# Patient Record
Sex: MALE | Race: WHITE | NOT HISPANIC OR LATINO | Employment: FULL TIME | URBAN - METROPOLITAN AREA
[De-identification: names, ages, dates, MRNs, and addresses within clinical notes are randomized per-mention and may not be internally consistent; named-entity substitution may affect disease eponyms.]

---

## 2018-02-10 ENCOUNTER — OFFICE VISIT (OUTPATIENT)
Dept: FAMILY MEDICINE CLINIC | Facility: CLINIC | Age: 33
End: 2018-02-10
Payer: COMMERCIAL

## 2018-02-10 VITALS
WEIGHT: 209 LBS | SYSTOLIC BLOOD PRESSURE: 122 MMHG | DIASTOLIC BLOOD PRESSURE: 86 MMHG | BODY MASS INDEX: 27.7 KG/M2 | TEMPERATURE: 97.1 F | HEART RATE: 72 BPM | HEIGHT: 73 IN | RESPIRATION RATE: 16 BRPM

## 2018-02-10 DIAGNOSIS — H66.91 RIGHT OTITIS MEDIA, UNSPECIFIED OTITIS MEDIA TYPE: Primary | ICD-10-CM

## 2018-02-10 PROCEDURE — 99213 OFFICE O/P EST LOW 20 MIN: CPT | Performed by: NURSE PRACTITIONER

## 2018-02-10 NOTE — PATIENT INSTRUCTIONS
Take medication with food  Can take some probiotic and yogurt with the medication  Avoid Q-tips  Advised dry ear precautions  Increase fluid intake, saline nasal rinses, and hot tea with honey and lemon  Cool air humidification can be helpful as well  May take Ibuprofen or Tylenol as needed for pain or fevers  Mucinex D for sinus congestion or Coricidin HBP if you have high blood pressure or a heart condition  Mucinex or Robitussin DM are effective for cough and chest congestion  Supportive care discussed and advised  Follow up for no improvement and worsening of conditions  Patient advised and educated when to see immediate medical care

## 2018-02-10 NOTE — PROGRESS NOTES
Assessment/Plan:  Take medication with food  Can take some probiotic and yogurt with the medication  Avoid Q-tips  Advised dry ear precautions  Increase fluid intake, saline nasal rinses, and hot tea with honey and lemon  Cool air humidification can be helpful as well  May take Ibuprofen or Tylenol as needed for pain or fevers  Mucinex D for sinus congestion or Coricidin HBP if you have high blood pressure or a heart condition  Mucinex or Robitussin DM are effective for cough and chest congestion  Supportive care discussed and advised  Follow up for no improvement and worsening of conditions  Patient advised and educated when to see immediate medical care  Diagnoses and all orders for this visit:    Right otitis media, unspecified otitis media type  -     clarithromycin (BIAXIN XL) 500 MG 24 hr tablet; Take 2 tablets (1,000 mg total) by mouth daily for 10 days          Return if symptoms worsen or fail to improve  Subjective:      Patient ID: Katelyn Rudolph is a 34 y o  male  Chief Complaint   Patient presents with    Cold Like Symptoms     chest congestion, body aches    Cough       HPI   Patient having cough with yellow phlegm, chest congestion, bodyaches, headache since 2/7 and fever of 100 7 and taking Mucinex DM and helped and had diarrhea this morning and last night  Having some right ear popping  Patient stated that his chills, fever and bodyaches are better since yesterday  The following portions of the patient's history were reviewed and updated as appropriate: allergies, current medications, past family history, past medical history, past social history, past surgical history and problem list       Review of Systems   Constitutional: Positive for chills, fatigue and fever  HENT: Positive for ear pain and sore throat  Negative for congestion, postnasal drip, sinus pain, sinus pressure, sneezing and voice change  Respiratory: Positive for cough and chest tightness  Negative for shortness of breath and wheezing  Cardiovascular: Negative  Gastrointestinal: Positive for diarrhea  Neurological: Negative for dizziness and headaches  Objective:    History   Smoking Status    Never Smoker   Smokeless Tobacco    Never Used       Allergies: Allergies   Allergen Reactions    Penicillins Dizziness     Reaction Date: [id number];        Vitals:  /86   Pulse 72   Temp (!) 97 1 °F (36 2 °C)   Resp 16   Ht 6' 1" (1 854 m)   Wt 94 8 kg (209 lb)   BMI 27 57 kg/m²     Current Outpatient Prescriptions   Medication Sig Dispense Refill    clarithromycin (BIAXIN XL) 500 MG 24 hr tablet Take 2 tablets (1,000 mg total) by mouth daily for 10 days 20 tablet 0     No current facility-administered medications for this visit  Physical Exam   Constitutional: He is oriented to person, place, and time  He appears well-developed and well-nourished  HENT:   Right Ear: Ear canal normal  Tympanic membrane is erythematous and bulging  A middle ear effusion is present  Left Ear: Tympanic membrane and ear canal normal    Nose: Nose normal  Right sinus exhibits no maxillary sinus tenderness and no frontal sinus tenderness  Left sinus exhibits no maxillary sinus tenderness and no frontal sinus tenderness  Mouth/Throat: Oropharynx is clear and moist and mucous membranes are normal    Cardiovascular: Normal rate and regular rhythm  Pulmonary/Chest: Effort normal and breath sounds normal    Musculoskeletal: Normal range of motion  Lymphadenopathy:        Right cervical: No superficial cervical and no posterior cervical adenopathy present  Left cervical: No superficial cervical and no posterior cervical adenopathy present  Neurological: He is alert and oriented to person, place, and time  Skin: Skin is warm and dry  Psychiatric: He has a normal mood and affect  Vitals reviewed          CELINE Smith

## 2018-02-14 ENCOUNTER — TELEPHONE (OUTPATIENT)
Dept: FAMILY MEDICINE CLINIC | Facility: CLINIC | Age: 33
End: 2018-02-14

## 2018-02-14 NOTE — TELEPHONE ENCOUNTER
Spoke to  Pt states  Has felt light headed  Since the beginning  of treatment   has completed 5 days of abx  Advised pt to stop  Abx   Pt states   Not having any chills, earache, or fever  symptoms  Have improved  Pt aware to call if symptoms arise  af/rma

## 2018-02-14 NOTE — TELEPHONE ENCOUNTER
JAMARI    Patient was seen and given an antibiotic  He said it is making him dizzy? Please call back and advise

## 2018-02-15 ENCOUNTER — OFFICE VISIT (OUTPATIENT)
Dept: FAMILY MEDICINE CLINIC | Facility: CLINIC | Age: 33
End: 2018-02-15
Payer: COMMERCIAL

## 2018-02-15 VITALS
TEMPERATURE: 97.3 F | HEIGHT: 73 IN | BODY MASS INDEX: 27.43 KG/M2 | RESPIRATION RATE: 20 BRPM | SYSTOLIC BLOOD PRESSURE: 136 MMHG | WEIGHT: 207 LBS | DIASTOLIC BLOOD PRESSURE: 72 MMHG | HEART RATE: 76 BPM

## 2018-02-15 DIAGNOSIS — R42 DIZZINESS: Primary | ICD-10-CM

## 2018-02-15 PROCEDURE — 3008F BODY MASS INDEX DOCD: CPT | Performed by: NURSE PRACTITIONER

## 2018-02-15 PROCEDURE — 99213 OFFICE O/P EST LOW 20 MIN: CPT | Performed by: NURSE PRACTITIONER

## 2018-02-15 RX ORDER — NEOMYCIN SULFATE, POLYMYXIN B SULFATE AND HYDROCORTISONE 10; 3.5; 1 MG/ML; MG/ML; [USP'U]/ML
3 SUSPENSION/ DROPS AURICULAR (OTIC) 4 TIMES DAILY
Qty: 10 ML | Refills: 0 | Status: SHIPPED | OUTPATIENT
Start: 2018-02-15 | End: 2018-10-23

## 2018-02-15 RX ORDER — MECLIZINE HCL 12.5 MG/1
12.5 TABLET ORAL 3 TIMES DAILY PRN
Qty: 30 TABLET | Refills: 0 | Status: SHIPPED | OUTPATIENT
Start: 2018-02-15 | End: 2018-10-23

## 2018-02-15 RX ORDER — MECLIZINE HCL 12.5 MG/1
12.5 TABLET ORAL 3 TIMES DAILY PRN
Qty: 30 TABLET | Refills: 0 | Status: SHIPPED | OUTPATIENT
Start: 2018-02-15 | End: 2018-02-15 | Stop reason: CLARIF

## 2018-02-15 NOTE — PROGRESS NOTES
Assessment/Plan:  Dizziness seems due to ear infection at this time and otitis media is better and will treat with topical antibiotic and meclizine prn ordered  IF no improvement, patient advised to follow up as neuro exam is normal and will consider MRI of head  Supportive care discussed and advised  Follow up for no improvement and worsening of conditions  Patient advised and educated when to see immediate medical care  Diagnoses and all orders for this visit:    Dizziness  -     Discontinue: meclizine (ANTIVERT) 12 5 MG tablet; Take 1 tablet (12 5 mg total) by mouth 3 (three) times a day as needed for dizziness  -     neomycin-polymyxin-hydrocortisone (CORTISPORIN) 0 35%-10,000 units/mL-1% otic suspension; Administer 3 drops to the right ear 4 (four) times a day  -     meclizine (ANTIVERT) 12 5 MG tablet; Take 1 tablet (12 5 mg total) by mouth 3 (three) times a day as needed for dizziness          Return if symptoms worsen or fail to improve  Subjective:      Patient ID: Agustina Ricketts is a 28 y o  male  Chief Complaint   Patient presents with   Benjie Slimmer Like Symptoms     from Saturday, finished abx    Blurred Vision     cant focus       HPI   Patient was seen on 2/10 and was started on Biaxin and started having dizziness on 2/12 and stopped biaxin yesterday and dizziness is better but still having it  Denies any fever, neck pain, headache, weakness, tingling, chest pain  Having mild blurry vision at times  The following portions of the patient's history were reviewed and updated as appropriate: allergies, current medications, past family history, past medical history, past social history, past surgical history and problem list       Review of Systems   Constitutional: Negative  HENT: Negative  Respiratory: Negative  Cardiovascular: Negative  Neurological: Positive for dizziness  Negative for tremors, facial asymmetry, speech difficulty, weakness, numbness and headaches  Psychiatric/Behavioral: Negative  Objective:    History   Smoking Status    Never Smoker   Smokeless Tobacco    Never Used       Allergies: Allergies   Allergen Reactions    Penicillins Dizziness     Reaction Date: [id number];        Vitals:  /72   Pulse 76   Temp (!) 97 3 °F (36 3 °C)   Resp 20   Ht 6' 1" (1 854 m)   Wt 93 9 kg (207 lb)   BMI 27 31 kg/m²     Current Outpatient Prescriptions   Medication Sig Dispense Refill    clarithromycin (BIAXIN XL) 500 MG 24 hr tablet Take 2 tablets (1,000 mg total) by mouth daily for 10 days 20 tablet 0    meclizine (ANTIVERT) 12 5 MG tablet Take 1 tablet (12 5 mg total) by mouth 3 (three) times a day as needed for dizziness 30 tablet 0    neomycin-polymyxin-hydrocortisone (CORTISPORIN) 0 35%-10,000 units/mL-1% otic suspension Administer 3 drops to the right ear 4 (four) times a day 10 mL 0     No current facility-administered medications for this visit  Physical Exam   Constitutional: He is oriented to person, place, and time  He appears well-developed and well-nourished  HENT:   Right Ear: Ear canal normal  Tympanic membrane is bulging  Left Ear: Tympanic membrane and ear canal normal    Nose: Nose normal  Right sinus exhibits no maxillary sinus tenderness and no frontal sinus tenderness  Left sinus exhibits no maxillary sinus tenderness and no frontal sinus tenderness  Mouth/Throat: Oropharynx is clear and moist and mucous membranes are normal    Cardiovascular: Normal rate, regular rhythm and normal heart sounds  Pulmonary/Chest: Effort normal and breath sounds normal    Musculoskeletal: Normal range of motion  Lymphadenopathy:        Right cervical: No superficial cervical and no posterior cervical adenopathy present  Left cervical: No superficial cervical and no posterior cervical adenopathy present  Neurological: He is alert and oriented to person, place, and time  He has normal strength and normal reflexes   No sensory deficit  He displays a negative Romberg sign  GCS eye subscore is 4  GCS verbal subscore is 5  GCS motor subscore is 6  Ania Hallpike negative for nystagmus  Skin: Skin is warm and dry  Psychiatric: He has a normal mood and affect  Vitals reviewed          CELINE Dennis

## 2018-02-15 NOTE — PATIENT INSTRUCTIONS
Supportive care discussed and advised  Follow up for no improvement and worsening of conditions  Patient advised and educated when to see immediate medical care  Dizziness   AMBULATORY CARE:   Dizziness  is a feeling of being off balance or unsteady  Common causes of dizziness are an inner ear fluid imbalance or a lack of oxygen in your blood  Dizziness may be acute (lasts 3 days or less) or chronic (lasts longer than 3 days)  You may have dizzy spells that last from seconds to a few hours  Common symptoms that may happen with dizziness:   · A feeling that your surroundings are moving even though you are standing still    · Ringing in your ears or hearing loss     · Feeling faint or lightheaded     · Weakness or unsteadiness     · Double vision or eye movements you cannot control    · Nausea or vomiting     · Confusion  Seek care immediately if:   · You have a headache and a stiff neck  · You have shaking chills and a fever  · You vomit over and over with no relief  · Your vomit or bowel movements are red or black  · You have pain in your chest, back, or abdomen  · You have numbness, especially in your face, arms, or legs  · You have trouble moving your arms or legs  · You are confused  Contact your healthcare provider if:   · You have a fever  · Your symptoms do not get better with treatment  · You have questions or concerns about your condition or care  Treatment for dizziness  depends on the cause  Your healthcare provider may give you oxygen or medicines to decrease your dizziness and nausea  He may also refer you to a specialist  Viviane Minder may need to be admitted to the hospital for treatment  Manage your symptoms:   · Do not drive  or operate heavy machinery when you are dizzy  · Get up slowly  from sitting or lying down  · Drink plenty of liquids  Liquids help prevent dehydration  Ask how much liquid to drink each day and which liquids are best for you    Follow up with your healthcare provider as directed:  Write down your questions so you remember to ask them during your visits  © 2017 [employer] Information is for End User's use only and may not be sold, redistributed or otherwise used for commercial purposes  All illustrations and images included in CareNotes® are the copyrighted property of [employer]  or Carlos Julien  The above information is an  only  It is not intended as medical advice for individual conditions or treatments  Talk to your doctor, nurse or pharmacist before following any medical regimen to see if it is safe and effective for you

## 2018-10-23 ENCOUNTER — OFFICE VISIT (OUTPATIENT)
Dept: FAMILY MEDICINE CLINIC | Facility: CLINIC | Age: 33
End: 2018-10-23
Payer: COMMERCIAL

## 2018-10-23 VITALS
WEIGHT: 182 LBS | RESPIRATION RATE: 16 BRPM | SYSTOLIC BLOOD PRESSURE: 138 MMHG | BODY MASS INDEX: 24.12 KG/M2 | HEIGHT: 73 IN | HEART RATE: 80 BPM | TEMPERATURE: 96.6 F | DIASTOLIC BLOOD PRESSURE: 98 MMHG

## 2018-10-23 DIAGNOSIS — F41.9 ANXIETY AND DEPRESSION: Primary | ICD-10-CM

## 2018-10-23 DIAGNOSIS — F32.A ANXIETY AND DEPRESSION: Primary | ICD-10-CM

## 2018-10-23 DIAGNOSIS — R03.0 ELEVATED BP WITHOUT DIAGNOSIS OF HYPERTENSION: ICD-10-CM

## 2018-10-23 PROCEDURE — 3008F BODY MASS INDEX DOCD: CPT | Performed by: FAMILY MEDICINE

## 2018-10-23 PROCEDURE — 99214 OFFICE O/P EST MOD 30 MIN: CPT | Performed by: FAMILY MEDICINE

## 2018-10-23 PROCEDURE — 1036F TOBACCO NON-USER: CPT | Performed by: FAMILY MEDICINE

## 2018-10-23 RX ORDER — VENLAFAXINE HYDROCHLORIDE 75 MG/1
75 TABLET, EXTENDED RELEASE ORAL
Qty: 30 TABLET | Refills: 1 | Status: SHIPPED | OUTPATIENT
Start: 2018-10-23 | End: 2018-12-05 | Stop reason: SDUPTHER

## 2018-10-23 NOTE — PROGRESS NOTES
Assessment/Plan:    No problem-specific Assessment & Plan notes found for this encounter  Anxiety/depression new  He will think about counseling  F/u 1m  F/u blood pressure     Diagnoses and all orders for this visit:    Anxiety and depression  -     venlafaxine 75 mg 24 hr tablet; Take 1 tablet (75 mg total) by mouth daily with breakfast    Elevated BP without diagnosis of hypertension    BMI 24 0-24 9, adult              No Follow-up on file  Subjective:      Patient ID: Carmina Johnson is a 28 y o  male  Chief Complaint   Patient presents with    Anxiety     prcma       HPI  Anxiety  Working [employer]  Has fam hx of anxiety  Sister told him to be seen  Feeling anxious  All the time  Constant thinking and worrying and overthinking  Excessive  Wife agrees  Very stressed  Sleeps ok but not rested  Struggles to get out of bed and not happy at work  Irritable  Short tempered  Cleans when stressed  No stress eater  Lost 30# in 8m  No counseling in past  The patient was advised about the office disability policy and that no forms of this nature would be promised or completed  This was explained clearly to the patient's understanding  Sad at times  fam hx of mood issues  Low energy  Feels tired at start of day  Years    The following portions of the patient's history were reviewed and updated as appropriate: allergies, current medications, past family history, past medical history, past social history, past surgical history and problem list     Review of Systems   Respiratory: Negative for shortness of breath  Cardiovascular: Negative for chest pain  Psychiatric/Behavioral: Negative for hallucinations and suicidal ideas  Current Outpatient Prescriptions   Medication Sig Dispense Refill    venlafaxine 75 mg 24 hr tablet Take 1 tablet (75 mg total) by mouth daily with breakfast 30 tablet 1     No current facility-administered medications for this visit          Objective:    /98   Pulse 80 Temp (!) 96 6 °F (35 9 °C)   Resp 16   Ht 6' 1" (1 854 m)   Wt 82 6 kg (182 lb)   BMI 24 01 kg/m²        Physical Exam   Constitutional: He appears well-developed  No distress  HENT:   Head: Normocephalic  Mouth/Throat: No oropharyngeal exudate  Eyes: Conjunctivae are normal  Right eye exhibits no discharge  Left eye exhibits no discharge  No scleral icterus  Neck: Neck supple  No thyromegaly present  Cardiovascular: Normal rate and intact distal pulses  No murmur heard  Pulmonary/Chest: Effort normal  No respiratory distress  Abdominal: Soft  There is no tenderness  There is no rebound  Musculoskeletal: He exhibits no edema or deformity  Lymphadenopathy:     He has no cervical adenopathy  Neurological: He is alert  He exhibits normal muscle tone  Skin: Skin is warm and dry  No rash noted  Psychiatric: His behavior is normal  Thought content normal    Nursing note and vitals reviewed               Belinda Ponce DO

## 2018-10-24 PROBLEM — R03.0 ELEVATED BP WITHOUT DIAGNOSIS OF HYPERTENSION: Status: ACTIVE | Noted: 2018-10-24

## 2018-12-05 ENCOUNTER — TELEPHONE (OUTPATIENT)
Dept: FAMILY MEDICINE CLINIC | Facility: CLINIC | Age: 33
End: 2018-12-05

## 2018-12-05 DIAGNOSIS — F32.A ANXIETY AND DEPRESSION: Primary | ICD-10-CM

## 2018-12-05 DIAGNOSIS — F41.9 ANXIETY AND DEPRESSION: Primary | ICD-10-CM

## 2018-12-05 RX ORDER — VENLAFAXINE HYDROCHLORIDE 37.5 MG/1
37.5 TABLET, EXTENDED RELEASE ORAL
Qty: 30 TABLET | Refills: 5 | Status: SHIPPED | OUTPATIENT
Start: 2018-12-05 | End: 2019-07-02 | Stop reason: SDUPTHER

## 2018-12-05 NOTE — TELEPHONE ENCOUNTER
DR Krysta Hardy    Patient feels the venlaflaxine is too strong for him  He would like the dose lowered or medication changed  Please advise

## 2018-12-05 NOTE — TELEPHONE ENCOUNTER
Pt aware of new prescription at pharmacy     Pt aware to call the Office with any questions or concerns  Task Complete rmklpn

## 2019-04-17 DIAGNOSIS — J06.9 UPPER RESPIRATORY TRACT INFECTION, UNSPECIFIED TYPE: Primary | ICD-10-CM

## 2019-04-17 RX ORDER — AZITHROMYCIN 250 MG/1
TABLET, FILM COATED ORAL
Qty: 6 TABLET | Refills: 0 | Status: SHIPPED | OUTPATIENT
Start: 2019-04-17 | End: 2019-04-22

## 2019-07-02 DIAGNOSIS — F32.A ANXIETY AND DEPRESSION: ICD-10-CM

## 2019-07-02 DIAGNOSIS — F41.9 ANXIETY AND DEPRESSION: ICD-10-CM

## 2019-07-02 RX ORDER — VENLAFAXINE 37.5 MG/1
TABLET ORAL
Qty: 30 TABLET | Refills: 0 | Status: SHIPPED | OUTPATIENT
Start: 2019-07-02 | End: 2019-08-12

## 2019-08-12 ENCOUNTER — OFFICE VISIT (OUTPATIENT)
Dept: FAMILY MEDICINE CLINIC | Facility: CLINIC | Age: 34
End: 2019-08-12
Payer: COMMERCIAL

## 2019-08-12 ENCOUNTER — APPOINTMENT (OUTPATIENT)
Dept: RADIOLOGY | Facility: CLINIC | Age: 34
End: 2019-08-12
Payer: COMMERCIAL

## 2019-08-12 VITALS
HEART RATE: 88 BPM | HEIGHT: 73 IN | RESPIRATION RATE: 16 BRPM | WEIGHT: 230 LBS | TEMPERATURE: 98.5 F | SYSTOLIC BLOOD PRESSURE: 126 MMHG | DIASTOLIC BLOOD PRESSURE: 80 MMHG | BODY MASS INDEX: 30.48 KG/M2

## 2019-08-12 DIAGNOSIS — M79.671 RIGHT FOOT PAIN: ICD-10-CM

## 2019-08-12 DIAGNOSIS — F41.1 GAD (GENERALIZED ANXIETY DISORDER): Primary | ICD-10-CM

## 2019-08-12 DIAGNOSIS — E66.9 OBESITY (BMI 30-39.9): ICD-10-CM

## 2019-08-12 PROBLEM — R03.0 ELEVATED BP WITHOUT DIAGNOSIS OF HYPERTENSION: Status: RESOLVED | Noted: 2018-10-24 | Resolved: 2019-08-12

## 2019-08-12 PROCEDURE — 99214 OFFICE O/P EST MOD 30 MIN: CPT | Performed by: FAMILY MEDICINE

## 2019-08-12 PROCEDURE — 73630 X-RAY EXAM OF FOOT: CPT

## 2019-08-12 PROCEDURE — 3008F BODY MASS INDEX DOCD: CPT | Performed by: FAMILY MEDICINE

## 2019-08-12 PROCEDURE — 1036F TOBACCO NON-USER: CPT | Performed by: FAMILY MEDICINE

## 2019-08-12 RX ORDER — ESCITALOPRAM OXALATE 10 MG/1
10 TABLET ORAL DAILY
Qty: 30 TABLET | Refills: 1 | Status: SHIPPED | OUTPATIENT
Start: 2019-08-12 | End: 2019-10-15 | Stop reason: SDUPTHER

## 2019-08-12 RX ORDER — MELOXICAM 15 MG/1
15 TABLET ORAL DAILY PRN
Qty: 30 TABLET | Refills: 0 | Status: SHIPPED | OUTPATIENT
Start: 2019-08-12 | End: 2020-04-22

## 2019-08-12 NOTE — LETTER
August 12, 2019     Patient: Krystle Jensen   YOB: 1985   Date of Visit: 8/12/2019       To Whom it May Concern:    Krystle Jensen is under my professional care  He was seen in my office on 8/12/2019  If you have any questions or concerns, please don't hesitate to call           Sincerely,          Tu Andino DO        CC: No Recipients

## 2019-08-12 NOTE — PROGRESS NOTES
Assessment/Plan:    No problem-specific Assessment & Plan notes found for this encounter  CHA not better, sleep issues with effexor, try lexapro, f/u 4w  Right foot pain, suspect tendonitis, rest/nsaids, xr, podiatry if no better, no sx of gout  bmi aware  BMI Counseling: Body mass index is 30 34 kg/m²  Discussed the patient's BMI with him  The BMI is above average  BMI counseling and education was provided to the patient  Nutrition recommendations include decreasing overall calorie intake  Diagnoses and all orders for this visit:    CHA (generalized anxiety disorder)  -     escitalopram (LEXAPRO) 10 mg tablet; Take 1 tablet (10 mg total) by mouth daily    Right foot pain  -     meloxicam (MOBIC) 15 mg tablet; Take 1 tablet (15 mg total) by mouth daily as needed for mild pain or moderate pain for up to 30 days  -     Ambulatory referral to Podiatry; Future  -     Cancel: XR foot 3+ vw left; Future  -     XR foot 3+ vw right; Future    Obesity (BMI 30-39  9)              Return in about 1 month (around 9/12/2019) for Recheck  Subjective:      Patient ID: Leeroy Boland is a 27 y o  male  Chief Complaint   Patient presents with    medication review     prcma    Foot Pain     right foot        HPI  Stopped effexor  Lower dose helped sleep difficulty but started to get tremors  Some DSA with lower dose  Not on good on empty stomach    Off entirely for 1m  Wife says he is irritable, better on something  Some anxiety  Does have stress at work  Depressed at times  Angry at times  Can get heart racing  Occasional crying  Some sadness  Denies suicidal/homicidal/destructive ideations    No food excess  Social etoh    Some wt gain lately    Right foot pain  1m  With dorsiflexion/extreme  No running  No injury  No shoewear changes    The following portions of the patient's history were reviewed and updated as appropriate: allergies, current medications, past family history, past medical history, past social history, past surgical history and problem list     Review of Systems   Constitutional: Negative for chills and fever  Psychiatric/Behavioral: Negative for suicidal ideas  Current Outpatient Medications   Medication Sig Dispense Refill    escitalopram (LEXAPRO) 10 mg tablet Take 1 tablet (10 mg total) by mouth daily 30 tablet 1    meloxicam (MOBIC) 15 mg tablet Take 1 tablet (15 mg total) by mouth daily as needed for mild pain or moderate pain for up to 30 days 30 tablet 0     No current facility-administered medications for this visit  Objective:    /80   Pulse 88   Temp 98 5 °F (36 9 °C)   Resp 16   Ht 6' 1" (1 854 m)   Wt 104 kg (230 lb)   BMI 30 34 kg/m²        Physical Exam   Constitutional: He appears well-developed  No distress  HENT:   Head: Normocephalic  Right Ear: External ear normal    Left Ear: External ear normal    Nose: Nose normal    Mouth/Throat: Oropharynx is clear and moist  No oropharyngeal exudate  Eyes: Conjunctivae are normal  No scleral icterus  Neck: Neck supple  No tracheal deviation present  No thyromegaly present  Cardiovascular: Normal rate, regular rhythm, normal heart sounds and intact distal pulses  No murmur heard  Pulmonary/Chest: Effort normal and breath sounds normal  No respiratory distress  He has no wheezes  He has no rales  Abdominal: Soft  Bowel sounds are normal  He exhibits no distension  There is no tenderness  Musculoskeletal: He exhibits tenderness  He exhibits no edema or deformity  Tender along 1st metatarsal with toe flexion, no redness or warmth or podagra   Lymphadenopathy:     He has no cervical adenopathy  Neurological: He is alert  He exhibits normal muscle tone  Skin: Skin is warm and dry  No pallor  Psychiatric: His behavior is normal  Thought content normal    Nursing note and vitals reviewed               Macie Malone DO

## 2019-10-15 DIAGNOSIS — F41.1 GAD (GENERALIZED ANXIETY DISORDER): ICD-10-CM

## 2019-10-15 RX ORDER — ESCITALOPRAM OXALATE 10 MG/1
10 TABLET ORAL DAILY
Qty: 90 TABLET | Refills: 1 | Status: SHIPPED | OUTPATIENT
Start: 2019-10-15 | End: 2019-10-21 | Stop reason: SDUPTHER

## 2019-10-21 ENCOUNTER — OFFICE VISIT (OUTPATIENT)
Dept: FAMILY MEDICINE CLINIC | Facility: CLINIC | Age: 34
End: 2019-10-21
Payer: COMMERCIAL

## 2019-10-21 VITALS
SYSTOLIC BLOOD PRESSURE: 120 MMHG | RESPIRATION RATE: 18 BRPM | DIASTOLIC BLOOD PRESSURE: 84 MMHG | OXYGEN SATURATION: 99 % | HEART RATE: 74 BPM | TEMPERATURE: 97.8 F | BODY MASS INDEX: 31.41 KG/M2 | WEIGHT: 237 LBS | HEIGHT: 73 IN

## 2019-10-21 DIAGNOSIS — F41.1 GAD (GENERALIZED ANXIETY DISORDER): ICD-10-CM

## 2019-10-21 DIAGNOSIS — F32.A ANXIETY AND DEPRESSION: Primary | ICD-10-CM

## 2019-10-21 DIAGNOSIS — Z13.1 SCREENING FOR DIABETES MELLITUS (DM): ICD-10-CM

## 2019-10-21 DIAGNOSIS — F41.9 ANXIETY AND DEPRESSION: Primary | ICD-10-CM

## 2019-10-21 DIAGNOSIS — Z13.83 SCREENING FOR CARDIOVASCULAR, RESPIRATORY, AND GENITOURINARY DISEASES: ICD-10-CM

## 2019-10-21 DIAGNOSIS — E66.9 OBESITY (BMI 30-39.9): ICD-10-CM

## 2019-10-21 DIAGNOSIS — Z13.89 SCREENING FOR CARDIOVASCULAR, RESPIRATORY, AND GENITOURINARY DISEASES: ICD-10-CM

## 2019-10-21 DIAGNOSIS — Z23 IMMUNIZATION DUE: ICD-10-CM

## 2019-10-21 DIAGNOSIS — M72.2 PLANTAR FASCIITIS, RIGHT: ICD-10-CM

## 2019-10-21 DIAGNOSIS — Z13.6 SCREENING FOR CARDIOVASCULAR, RESPIRATORY, AND GENITOURINARY DISEASES: ICD-10-CM

## 2019-10-21 PROCEDURE — 3008F BODY MASS INDEX DOCD: CPT | Performed by: FAMILY MEDICINE

## 2019-10-21 PROCEDURE — 90471 IMMUNIZATION ADMIN: CPT

## 2019-10-21 PROCEDURE — 99214 OFFICE O/P EST MOD 30 MIN: CPT | Performed by: FAMILY MEDICINE

## 2019-10-21 PROCEDURE — 90686 IIV4 VACC NO PRSV 0.5 ML IM: CPT

## 2019-10-21 RX ORDER — ESCITALOPRAM OXALATE 10 MG/1
10 TABLET ORAL DAILY
Qty: 90 TABLET | Refills: 1 | Status: SHIPPED | OUTPATIENT
Start: 2019-10-21 | End: 2020-04-22 | Stop reason: SDUPTHER

## 2019-10-21 NOTE — PROGRESS NOTES
Assessment/Plan:    No problem-specific Assessment & Plan notes found for this encounter  Anxiety/depression better on lexapro, cont same  Right plantar fascitis, ice/massage instructed, arch supports, podiatry if no better  Screening labs ordered     Diagnoses and all orders for this visit:    Anxiety and depression    Plantar fasciitis, right    Screening for cardiovascular, respiratory, and genitourinary diseases  -     Lipid Panel with Direct LDL reflex; Future  -     Lipid Panel with Direct LDL reflex    Screening for diabetes mellitus (DM)  -     Comprehensive metabolic panel; Future  -     Comprehensive metabolic panel    Obesity (BMI 30-39 9)  -     TSH, 3rd generation; Future  -     TSH, 3rd generation    CHA (generalized anxiety disorder)  -     escitalopram (LEXAPRO) 10 mg tablet; Take 1 tablet (10 mg total) by mouth daily    Immunization due  -     influenza vaccine, [id number], quadrivalent, 0 5 mL, preservative-free, for adult and pediatric patients 6 mos+ (AFLURIA, FLUARIX, FLULAVAL, FLUZONE)              Return in about 6 months (around 4/21/2020) for Recheck  Subjective:      Patient ID: Aisha Cantu is a 27 y o  male      Chief Complaint   Patient presents with    Follow-up     medication review and anxiety issues  rmklpn       HPI  Eating out less  Now packing lunches  3 kids  Plays flag football on weekends  CHA stable on lexapro  effexor made him jittery and lightheadedness  Not lost weight  Trying to eat healthier  Tired in am  Sleeps 8 hours  Insomnia with effexor, no more waking  Work stressors  More relaxed  Foot better, took a while  Heel pain in am R>L  Worse with first few steps  No redness or discoloration  Few wks    The following portions of the patient's history were reviewed and updated as appropriate: allergies, current medications, past family history, past medical history, past social history, past surgical history and problem list     Review of Systems   Respiratory: Negative for shortness of breath  Cardiovascular: Negative for chest pain and leg swelling  Current Outpatient Medications   Medication Sig Dispense Refill    escitalopram (LEXAPRO) 10 mg tablet Take 1 tablet (10 mg total) by mouth daily 90 tablet 1    meloxicam (MOBIC) 15 mg tablet Take 1 tablet (15 mg total) by mouth daily as needed for mild pain or moderate pain for up to 30 days 30 tablet 0     No current facility-administered medications for this visit  Objective:    /84   Pulse 74   Temp 97 8 °F (36 6 °C)   Resp 18   Ht 6' 1" (1 854 m)   Wt 108 kg (237 lb)   SpO2 99%   BMI 31 27 kg/m²        Physical Exam   Constitutional: He appears well-developed  No distress  HENT:   Head: Normocephalic  Mouth/Throat: No oropharyngeal exudate  Eyes: Conjunctivae are normal  No scleral icterus  Neck: Neck supple  Cardiovascular: Normal rate, regular rhythm and intact distal pulses  Pulmonary/Chest: Effort normal and breath sounds normal  No respiratory distress  He has no wheezes  Abdominal: Soft  Bowel sounds are normal  There is no tenderness  Musculoskeletal: He exhibits tenderness  He exhibits no edema or deformity  Right anterior heel   Neurological: He is alert  Skin: Skin is warm and dry  No pallor  Psychiatric: His behavior is normal  Thought content normal    Nursing note and vitals reviewed               Dain Miller DO

## 2019-12-07 ENCOUNTER — OFFICE VISIT (OUTPATIENT)
Dept: FAMILY MEDICINE CLINIC | Facility: CLINIC | Age: 34
End: 2019-12-07
Payer: COMMERCIAL

## 2019-12-07 VITALS
SYSTOLIC BLOOD PRESSURE: 122 MMHG | HEIGHT: 73 IN | BODY MASS INDEX: 31.14 KG/M2 | TEMPERATURE: 97.6 F | WEIGHT: 235 LBS | OXYGEN SATURATION: 97 % | HEART RATE: 68 BPM | RESPIRATION RATE: 18 BRPM | DIASTOLIC BLOOD PRESSURE: 74 MMHG

## 2019-12-07 DIAGNOSIS — J01.00 ACUTE NON-RECURRENT MAXILLARY SINUSITIS: Primary | ICD-10-CM

## 2019-12-07 PROCEDURE — 3008F BODY MASS INDEX DOCD: CPT | Performed by: NURSE PRACTITIONER

## 2019-12-07 PROCEDURE — 1036F TOBACCO NON-USER: CPT | Performed by: NURSE PRACTITIONER

## 2019-12-07 PROCEDURE — 99213 OFFICE O/P EST LOW 20 MIN: CPT | Performed by: NURSE PRACTITIONER

## 2019-12-07 RX ORDER — FLUTICASONE PROPIONATE 50 MCG
2 SPRAY, SUSPENSION (ML) NASAL DAILY
Qty: 16 G | Refills: 3 | Status: SHIPPED | OUTPATIENT
Start: 2019-12-07 | End: 2020-04-22 | Stop reason: SDUPTHER

## 2019-12-07 RX ORDER — AZITHROMYCIN 250 MG/1
TABLET, FILM COATED ORAL
Qty: 6 TABLET | Refills: 0 | Status: SHIPPED | OUTPATIENT
Start: 2019-12-07 | End: 2019-12-12

## 2019-12-07 NOTE — PROGRESS NOTES
Assessment/Plan:    Take medications as directed  Reviewed supportive care  RTO prn  Pt to call for ENT referral once he gets his new insurance  1  Acute non-recurrent maxillary sinusitis  -     fluticasone (FLONASE) 50 mcg/act nasal spray; 2 sprays into each nostril daily  -     azithromycin (ZITHROMAX) 250 mg tablet; 2 tabs PO day 1, then 1 tab PO days 2-5            There are no Patient Instructions on file for this visit  No follow-ups on file  Subjective:      Patient ID: Maribell Munguia is a 27 y o  male  Chief Complaint   Patient presents with    Sinus Problem     [facility]       Here today with complaints of cold symptoms for the past month  He has worsening sinus and nasal congestion and sinus pressure  He has a cough that is worse in the morning  Denies fevers, SOB, or wheezing  Using his neti pot frequently  States whenever he gets a cold, it always progresses to a sinus infection      The following portions of the patient's history were reviewed and updated as appropriate: allergies, current medications, past family history, past medical history, past social history, past surgical history and problem list     Review of Systems   Constitutional: Negative for chills, fatigue and fever  HENT: Positive for congestion, sinus pressure and sore throat  Negative for ear pain, postnasal drip and rhinorrhea  Respiratory: Negative for cough, shortness of breath and wheezing  Cardiovascular: Negative for chest pain  Gastrointestinal: Negative for abdominal pain, diarrhea, nausea and vomiting  Musculoskeletal: Negative for arthralgias  Skin: Negative for rash  Neurological: Negative for headaches           Current Outpatient Medications   Medication Sig Dispense Refill    escitalopram (LEXAPRO) 10 mg tablet Take 1 tablet (10 mg total) by mouth daily 90 tablet 1    azithromycin (ZITHROMAX) 250 mg tablet 2 tabs PO day 1, then 1 tab PO days 2-5 6 tablet 0    fluticasone (FLONASE) 50 mcg/act nasal spray 2 sprays into each nostril daily 16 g 3    meloxicam (MOBIC) 15 mg tablet Take 1 tablet (15 mg total) by mouth daily as needed for mild pain or moderate pain for up to 30 days (Patient not taking: Reported on 12/7/2019) 30 tablet 0     No current facility-administered medications for this visit  Objective:    /74   Pulse 68   Temp 97 6 °F (36 4 °C)   Resp 18   Ht 6' 1" (1 854 m)   Wt 107 kg (235 lb)   SpO2 97%   BMI 31 00 kg/m²        Physical Exam   Constitutional: He appears well-developed and well-nourished  HENT:   Head: Normocephalic and atraumatic  Right Ear: Tympanic membrane, external ear and ear canal normal    Left Ear: Tympanic membrane, external ear and ear canal normal    Nose: Mucosal edema and rhinorrhea (mucoid) present  Right sinus exhibits maxillary sinus tenderness  Left sinus exhibits maxillary sinus tenderness  Mouth/Throat: Uvula is midline, oropharynx is clear and moist and mucous membranes are normal    Eyes: Conjunctivae are normal    Neck: Neck supple  No edema present  No thyromegaly present  Cardiovascular: Normal rate, regular rhythm, normal heart sounds and intact distal pulses  No murmur heard  Pulmonary/Chest: Effort normal and breath sounds normal    Abdominal: Bowel sounds are normal  He exhibits no distension  There is no splenomegaly or hepatomegaly  There is no tenderness  Lymphadenopathy:        Right cervical: No superficial cervical adenopathy present  Left cervical: No superficial cervical adenopathy present  Skin: Skin is warm, dry and intact  No rash noted  Psychiatric: He has a normal mood and affect  Nursing note and vitals reviewed               Matt Franks

## 2020-04-22 ENCOUNTER — TELEMEDICINE (OUTPATIENT)
Dept: FAMILY MEDICINE CLINIC | Facility: CLINIC | Age: 35
End: 2020-04-22
Payer: COMMERCIAL

## 2020-04-22 DIAGNOSIS — F32.A ANXIETY AND DEPRESSION: Primary | ICD-10-CM

## 2020-04-22 DIAGNOSIS — F41.1 GAD (GENERALIZED ANXIETY DISORDER): ICD-10-CM

## 2020-04-22 DIAGNOSIS — F41.9 ANXIETY AND DEPRESSION: Primary | ICD-10-CM

## 2020-04-22 DIAGNOSIS — J30.9 ALLERGIC RHINITIS, UNSPECIFIED SEASONALITY, UNSPECIFIED TRIGGER: ICD-10-CM

## 2020-04-22 DIAGNOSIS — E66.9 OBESITY (BMI 30-39.9): ICD-10-CM

## 2020-04-22 DIAGNOSIS — G56.03 CARPAL TUNNEL SYNDROME, BILATERAL: ICD-10-CM

## 2020-04-22 PROCEDURE — 99214 OFFICE O/P EST MOD 30 MIN: CPT | Performed by: FAMILY MEDICINE

## 2020-04-22 RX ORDER — FLUTICASONE PROPIONATE 50 MCG
2 SPRAY, SUSPENSION (ML) NASAL DAILY
Qty: 16 G | Refills: 5 | Status: SHIPPED | OUTPATIENT
Start: 2020-04-22 | End: 2021-06-06

## 2020-04-22 RX ORDER — ESCITALOPRAM OXALATE 10 MG/1
10 TABLET ORAL DAILY
Qty: 90 TABLET | Refills: 1 | Status: SHIPPED | OUTPATIENT
Start: 2020-04-22 | End: 2021-11-01

## 2020-04-22 RX ORDER — DOXYCYCLINE HYCLATE 100 MG
TABLET ORAL
COMMUNITY
Start: 2020-04-15 | End: 2020-04-22

## 2020-04-22 RX ORDER — LISINOPRIL AND HYDROCHLOROTHIAZIDE 20; 12.5 MG/1; MG/1
TABLET ORAL
COMMUNITY
Start: 2020-03-30 | End: 2020-04-22

## 2020-10-20 ENCOUNTER — TELEMEDICINE (OUTPATIENT)
Dept: FAMILY MEDICINE CLINIC | Facility: CLINIC | Age: 35
End: 2020-10-20
Payer: COMMERCIAL

## 2020-10-20 VITALS — TEMPERATURE: 98 F

## 2020-10-20 DIAGNOSIS — J30.9 ALLERGIC RHINITIS, UNSPECIFIED SEASONALITY, UNSPECIFIED TRIGGER: Primary | ICD-10-CM

## 2020-10-20 DIAGNOSIS — J06.9 ACUTE URI: ICD-10-CM

## 2020-10-20 PROCEDURE — 99213 OFFICE O/P EST LOW 20 MIN: CPT | Performed by: NURSE PRACTITIONER

## 2020-10-20 PROCEDURE — 1036F TOBACCO NON-USER: CPT | Performed by: NURSE PRACTITIONER

## 2020-11-19 ENCOUNTER — TELEPHONE (OUTPATIENT)
Dept: FAMILY MEDICINE CLINIC | Facility: CLINIC | Age: 35
End: 2020-11-19

## 2020-11-19 DIAGNOSIS — Z20.822 EXPOSURE TO COVID-19 VIRUS: Primary | ICD-10-CM

## 2020-11-20 DIAGNOSIS — Z20.822 EXPOSURE TO COVID-19 VIRUS: ICD-10-CM

## 2020-11-20 PROCEDURE — U0003 INFECTIOUS AGENT DETECTION BY NUCLEIC ACID (DNA OR RNA); SEVERE ACUTE RESPIRATORY SYNDROME CORONAVIRUS 2 (SARS-COV-2) (CORONAVIRUS DISEASE [COVID-19]), AMPLIFIED PROBE TECHNIQUE, MAKING USE OF HIGH THROUGHPUT TECHNOLOGIES AS DESCRIBED BY CMS-2020-01-R: HCPCS | Performed by: FAMILY MEDICINE

## 2020-11-22 LAB — SARS-COV-2 RNA SPEC QL NAA+PROBE: NOT DETECTED

## 2020-12-12 ENCOUNTER — OFFICE VISIT (OUTPATIENT)
Dept: URGENT CARE | Facility: CLINIC | Age: 35
End: 2020-12-12
Payer: COMMERCIAL

## 2020-12-12 ENCOUNTER — APPOINTMENT (OUTPATIENT)
Dept: RADIOLOGY | Facility: CLINIC | Age: 35
End: 2020-12-12
Payer: COMMERCIAL

## 2020-12-12 VITALS
DIASTOLIC BLOOD PRESSURE: 88 MMHG | BODY MASS INDEX: 31.53 KG/M2 | TEMPERATURE: 96.5 F | RESPIRATION RATE: 16 BRPM | OXYGEN SATURATION: 100 % | HEART RATE: 82 BPM | SYSTOLIC BLOOD PRESSURE: 132 MMHG | WEIGHT: 239 LBS

## 2020-12-12 DIAGNOSIS — M25.511 ACUTE PAIN OF RIGHT SHOULDER: ICD-10-CM

## 2020-12-12 DIAGNOSIS — S42.034A CLOSED NONDISPLACED FRACTURE OF ACROMIAL END OF RIGHT CLAVICLE, INITIAL ENCOUNTER: Primary | ICD-10-CM

## 2020-12-12 PROCEDURE — 1036F TOBACCO NON-USER: CPT | Performed by: PHYSICIAN ASSISTANT

## 2020-12-12 PROCEDURE — 73030 X-RAY EXAM OF SHOULDER: CPT

## 2020-12-12 PROCEDURE — 99213 OFFICE O/P EST LOW 20 MIN: CPT | Performed by: PHYSICIAN ASSISTANT

## 2021-06-06 PROBLEM — M72.2 PLANTAR FASCIITIS, RIGHT: Status: RESOLVED | Noted: 2019-10-21 | Resolved: 2021-06-06

## 2021-06-06 PROBLEM — M79.671 RIGHT FOOT PAIN: Status: RESOLVED | Noted: 2019-08-12 | Resolved: 2021-06-06

## 2021-06-08 ENCOUNTER — OFFICE VISIT (OUTPATIENT)
Dept: FAMILY MEDICINE CLINIC | Facility: CLINIC | Age: 36
End: 2021-06-08
Payer: COMMERCIAL

## 2021-06-08 VITALS
HEART RATE: 76 BPM | BODY MASS INDEX: 32.74 KG/M2 | TEMPERATURE: 96.6 F | WEIGHT: 247 LBS | DIASTOLIC BLOOD PRESSURE: 88 MMHG | HEIGHT: 73 IN | RESPIRATION RATE: 16 BRPM | SYSTOLIC BLOOD PRESSURE: 128 MMHG

## 2021-06-08 DIAGNOSIS — Z13.6 SCREENING FOR CARDIOVASCULAR, RESPIRATORY, AND GENITOURINARY DISEASES: ICD-10-CM

## 2021-06-08 DIAGNOSIS — R53.83 OTHER FATIGUE: ICD-10-CM

## 2021-06-08 DIAGNOSIS — Z13.1 SCREENING FOR DIABETES MELLITUS (DM): ICD-10-CM

## 2021-06-08 DIAGNOSIS — M25.512 PAIN OF BOTH SHOULDER JOINTS: ICD-10-CM

## 2021-06-08 DIAGNOSIS — Z13.83 SCREENING FOR CARDIOVASCULAR, RESPIRATORY, AND GENITOURINARY DISEASES: ICD-10-CM

## 2021-06-08 DIAGNOSIS — M25.59 PAIN IN OTHER JOINT: Primary | ICD-10-CM

## 2021-06-08 DIAGNOSIS — M25.511 PAIN OF BOTH SHOULDER JOINTS: ICD-10-CM

## 2021-06-08 DIAGNOSIS — Z13.89 SCREENING FOR CARDIOVASCULAR, RESPIRATORY, AND GENITOURINARY DISEASES: ICD-10-CM

## 2021-06-08 DIAGNOSIS — E66.9 OBESITY (BMI 30-39.9): ICD-10-CM

## 2021-06-08 PROBLEM — M25.50 ARTHRALGIA: Status: ACTIVE | Noted: 2021-06-08

## 2021-06-08 PROCEDURE — 3725F SCREEN DEPRESSION PERFORMED: CPT | Performed by: FAMILY MEDICINE

## 2021-06-08 PROCEDURE — 3008F BODY MASS INDEX DOCD: CPT | Performed by: FAMILY MEDICINE

## 2021-06-08 PROCEDURE — 1036F TOBACCO NON-USER: CPT | Performed by: FAMILY MEDICINE

## 2021-06-08 PROCEDURE — 99214 OFFICE O/P EST MOD 30 MIN: CPT | Performed by: FAMILY MEDICINE

## 2021-06-08 NOTE — PROGRESS NOTES
Assessment/Plan:    No problem-specific Assessment & Plan notes found for this encounter  Arthralgias  R/o autoimmune, lyme, myositis, RA, gout  Mom has scleroderma and sees rheumatologist  Await results  May need to see rheumatology pending results  Denies mood symptoms or mood related symptoms    Fatigue and wt gain  Stopped his lexapro due to wt gain  Check tsh and testosterone    Update prevention, check cmp and flp     Diagnoses and all orders for this visit:    Pain in other joint  -     Lyme Total Antibody Profile with reflex to WB; Future  -     Uric acid; Future  -     C-reactive protein; Future  -     Sedimentation rate, automated; Future  -     GALILEO Screen w/ Reflex to Titer/Pattern; Future  -     RF Screen w/ Reflex to Titer; Future  -     CK; Future  -     Aldolase; Future  -     CBC and differential; Future  -     Cyclic citrul peptide antibody, IgG; Future    Pain of both shoulder joints    Screening for cardiovascular, respiratory, and genitourinary diseases  -     Lipid Panel with Direct LDL reflex; Future    Screening for diabetes mellitus (DM)  -     Comprehensive metabolic panel; Future    Obesity (BMI 30-39 9)  -     TSH, 3rd generation; Future  -     Testosterone; Future    Other fatigue  -     Testosterone; Future          Return in about 1 month (around 7/8/2021) for Annual physical     Subjective:      Patient ID: Nic Ramos is a 39 y o  male  Chief Complaint   Patient presents with    Shoulder Pain     both shoulders, sharp pain in right and dull pain in left for a couple months now   ac/lpn    Numbness     in extremities        HPI  4 child, daughter, on the way in November  Legs fall asleep  Feet tingle  Every am, b/l hands tingling and numb, get better after few minutes  Also with overhead activities  Elbows feels aches/sore  [place]   Sometimes drops things  Overall years  Worse few months    No tick bite or lyme pos test  Has dogs, sees ticks   No herbals or supplements  Shoulders affect sleeping  Hard to sleep on side  Some sharp shoulder pains right side, dull on left  No discoloration of fingers  No joint swelling or dislocation    Physical job  No recent vacation  Did try aleve helped some but not gone    Off lexapro  Has gained wt which he blames on the medication  Felt it wasn't working  Not related to his sx per pt    The following portions of the patient's history were reviewed and updated as appropriate: allergies, current medications, past family history, past medical history, past social history, past surgical history and problem list     Review of Systems   Constitutional: Negative for fever  Respiratory: Negative for shortness of breath  Current Outpatient Medications   Medication Sig Dispense Refill    escitalopram (LEXAPRO) 10 mg tablet Take 1 tablet (10 mg total) by mouth daily (Patient not taking: Reported on 6/8/2021) 90 tablet 1     No current facility-administered medications for this visit  Objective:    /88   Pulse 76   Temp (!) 96 6 °F (35 9 °C)   Resp 16   Ht 6' 1" (1 854 m)   Wt 112 kg (247 lb)   BMI 32 59 kg/m²        Physical Exam  Vitals signs and nursing note reviewed  Constitutional:       Appearance: He is well-developed  He is obese  He is not ill-appearing  HENT:      Head: Normocephalic  Right Ear: Tympanic membrane normal       Left Ear: Tympanic membrane normal    Eyes:      General: No scleral icterus  Conjunctiva/sclera: Conjunctivae normal    Neck:      Musculoskeletal: Neck supple  Thyroid: No thyromegaly  Cardiovascular:      Rate and Rhythm: Normal rate and regular rhythm  Heart sounds: No murmur  Pulmonary:      Effort: Pulmonary effort is normal  No respiratory distress  Breath sounds: No wheezing  Abdominal:      Palpations: Abdomen is soft  Musculoskeletal:         General: No swelling, tenderness or deformity        Comments: No finger/hand deformity or synovitis,  Good rom shoulder and elbows w/o crepitus, martínez neg b/l   Skin:     General: Skin is warm and dry  Coloration: Skin is not jaundiced or pale  Neurological:      Mental Status: He is alert  Motor: No weakness  Gait: Gait normal    Psychiatric:         Mood and Affect: Mood normal          Behavior: Behavior normal          Thought Content: Thought content normal        BMI Counseling: Body mass index is 32 59 kg/m²  The BMI is above normal  Nutrition recommendations include decreasing portion sizes and moderation in carbohydrate intake  Exercise recommendations include exercising 3-5 times per week  No pharmacotherapy was ordered                  Idania Jiménez DO

## 2021-06-09 DIAGNOSIS — R53.83 OTHER FATIGUE: Primary | ICD-10-CM

## 2021-06-18 LAB
ALBUMIN SERPL-MCNC: 4.5 G/DL (ref 3.6–5.1)
ALBUMIN/GLOB SERPL: 1.9 (CALC) (ref 1–2.5)
ALDOLASE SERPL-CCNC: 7.2 U/L
ALP SERPL-CCNC: 95 U/L (ref 36–130)
ALT SERPL-CCNC: 37 U/L (ref 9–46)
ANA SER QL IF: NEGATIVE
AST SERPL-CCNC: 28 U/L (ref 10–40)
B BURGDOR AB SER IA-ACNC: <0.9 INDEX
BASOPHILS # BLD AUTO: 28 CELLS/UL (ref 0–200)
BASOPHILS NFR BLD AUTO: 0.5 %
BILIRUB SERPL-MCNC: 0.5 MG/DL (ref 0.2–1.2)
BUN SERPL-MCNC: 19 MG/DL (ref 7–25)
BUN/CREAT SERPL: ABNORMAL (CALC) (ref 6–22)
CALCIUM SERPL-MCNC: 9.5 MG/DL (ref 8.6–10.3)
CCP IGG SERPL-ACNC: <16 UNITS
CHLORIDE SERPL-SCNC: 105 MMOL/L (ref 98–110)
CHOLEST SERPL-MCNC: 192 MG/DL
CHOLEST/HDLC SERPL: 4.7 (CALC)
CK SERPL-CCNC: 306 U/L (ref 44–196)
CO2 SERPL-SCNC: 28 MMOL/L (ref 20–32)
CREAT SERPL-MCNC: 0.94 MG/DL (ref 0.6–1.35)
CRP SERPL-MCNC: 2.2 MG/L
EOSINOPHIL # BLD AUTO: 143 CELLS/UL (ref 15–500)
EOSINOPHIL NFR BLD AUTO: 2.6 %
ERYTHROCYTE [DISTWIDTH] IN BLOOD BY AUTOMATED COUNT: 13.1 % (ref 11–15)
ERYTHROCYTE [SEDIMENTATION RATE] IN BLOOD BY WESTERGREN METHOD: 2 MM/H
GLOBULIN SER CALC-MCNC: 2.4 G/DL (CALC) (ref 1.9–3.7)
GLUCOSE SERPL-MCNC: 101 MG/DL (ref 65–99)
HCT VFR BLD AUTO: 42.7 % (ref 38.5–50)
HDLC SERPL-MCNC: 41 MG/DL
HGB BLD-MCNC: 14.7 G/DL (ref 13.2–17.1)
LDLC SERPL CALC-MCNC: 124 MG/DL (CALC)
LYMPHOCYTES # BLD AUTO: 2261 CELLS/UL (ref 850–3900)
LYMPHOCYTES NFR BLD AUTO: 41.1 %
MCH RBC QN AUTO: 29.1 PG (ref 27–33)
MCHC RBC AUTO-ENTMCNC: 34.4 G/DL (ref 32–36)
MCV RBC AUTO: 84.6 FL (ref 80–100)
MONOCYTES # BLD AUTO: 605 CELLS/UL (ref 200–950)
MONOCYTES NFR BLD AUTO: 11 %
NEUTROPHILS # BLD AUTO: 2464 CELLS/UL (ref 1500–7800)
NEUTROPHILS NFR BLD AUTO: 44.8 %
NONHDLC SERPL-MCNC: 151 MG/DL (CALC)
PLATELET # BLD AUTO: 261 THOUSAND/UL (ref 140–400)
PMV BLD REES-ECKER: 9.7 FL (ref 7.5–12.5)
POTASSIUM SERPL-SCNC: 4.2 MMOL/L (ref 3.5–5.3)
PROT SERPL-MCNC: 6.9 G/DL (ref 6.1–8.1)
RBC # BLD AUTO: 5.05 MILLION/UL (ref 4.2–5.8)
RHEUMATOID FACT SERPL-ACNC: <14 IU/ML
SL AMB EGFR AFRICAN AMERICAN: 120 ML/MIN/1.73M2
SL AMB EGFR NON AFRICAN AMERICAN: 103 ML/MIN/1.73M2
SODIUM SERPL-SCNC: 139 MMOL/L (ref 135–146)
TESTOST SERPL-MCNC: 341 NG/DL (ref 250–827)
TRIGL SERPL-MCNC: 157 MG/DL
TSH SERPL-ACNC: 0.89 MIU/L (ref 0.4–4.5)
URATE SERPL-MCNC: 5.7 MG/DL (ref 4–8)
VIT B12 SERPL-MCNC: 376 PG/ML (ref 200–1100)
WBC # BLD AUTO: 5.5 THOUSAND/UL (ref 3.8–10.8)

## 2021-08-12 ENCOUNTER — APPOINTMENT (OUTPATIENT)
Dept: RADIOLOGY | Facility: CLINIC | Age: 36
End: 2021-08-12
Payer: COMMERCIAL

## 2021-08-12 ENCOUNTER — HOSPITAL ENCOUNTER (OUTPATIENT)
Dept: RADIOLOGY | Facility: HOSPITAL | Age: 36
Discharge: HOME | End: 2021-08-12
Payer: COMMERCIAL

## 2021-08-12 ENCOUNTER — OFFICE VISIT (OUTPATIENT)
Dept: OBGYN CLINIC | Facility: CLINIC | Age: 36
End: 2021-08-12
Payer: COMMERCIAL

## 2021-08-12 VITALS
DIASTOLIC BLOOD PRESSURE: 97 MMHG | HEART RATE: 77 BPM | HEIGHT: 73 IN | BODY MASS INDEX: 32.68 KG/M2 | SYSTOLIC BLOOD PRESSURE: 144 MMHG | WEIGHT: 246.6 LBS

## 2021-08-12 DIAGNOSIS — S63.641A SPRAIN OF METACARPOPHALANGEAL (MCP) JOINT OF RIGHT THUMB, INITIAL ENCOUNTER: ICD-10-CM

## 2021-08-12 DIAGNOSIS — S62.521A CLOSED DISPLACED FRACTURE OF DISTAL PHALANX OF RIGHT THUMB, INITIAL ENCOUNTER: ICD-10-CM

## 2021-08-12 DIAGNOSIS — M79.644 THUMB PAIN, RIGHT: ICD-10-CM

## 2021-08-12 DIAGNOSIS — M79.644 THUMB PAIN, RIGHT: Primary | ICD-10-CM

## 2021-08-12 PROCEDURE — 73140 X-RAY EXAM OF FINGER(S): CPT

## 2021-08-12 PROCEDURE — G1004 CDSM NDSC: HCPCS

## 2021-08-12 PROCEDURE — 99204 OFFICE O/P NEW MOD 45 MIN: CPT | Performed by: PHYSICIAN ASSISTANT

## 2021-08-12 PROCEDURE — 73218 MRI UPPER EXTREMITY W/O DYE: CPT

## 2021-08-12 NOTE — PROGRESS NOTES
Assessment/Plan:  1  Thumb pain, right  XR thumb right first digit-min 2v    MRI thumb right wo contrast    CANCELED: MRI thumb right wo contrast   2  Closed displaced fracture of distal phalanx of right thumb, initial encounter     3  Sprain of metacarpophalangeal (MCP) joint of right thumb, initial encounter         The patient does have an  Right thumb avulsion fracture of the ulnar aspect of the proximal phalanx proximally  At the MCP joint  There is also some laxity of his UCL on examination  I am ordering an MRI to rule out UCL tear  He does already have a thumb spica brace that he can wear  I will call him with his results after his MRI  If a tear is found, I will refer him to our hand surgeon, Dr Graciela Yi  He should refrain from any softball at this point  Subjective: Janie Friedman is a 39 y o  male who presents today for evaluation of his right thumb  He injured this 4 days ago when he was playing softball and had a bone his hand was trying to tag someone out  The other player try to pull the ball from his hand, catching his thumb  He had significant ecchymosis and swelling after this injury  He notes pain about the ulnar MCP joint, which is worse with activity and better with rest   He notes a fullness /ball about this region as well  He notes that his ecchymosis has resolved and his pain has slightly improved  He notes good sensation into the hand  He does have a thumb spica brace that he has been wearing  Review of Systems   Constitutional: Negative  Negative for chills and fever  HENT: Negative  Negative for ear pain and sore throat  Eyes: Negative  Negative for pain and redness  Respiratory: Negative  Negative for shortness of breath and wheezing  Cardiovascular: Negative for chest pain and palpitations  Gastrointestinal: Negative  Negative for abdominal pain and blood in stool  Endocrine: Negative  Negative for polydipsia and polyuria     Genitourinary: Negative  Negative for difficulty urinating and dysuria  Musculoskeletal:        As noted in HPI   Skin: Negative  Negative for pallor and rash  Neurological: Negative  Negative for dizziness and numbness  Hematological: Negative  Negative for adenopathy  Does not bruise/bleed easily  Psychiatric/Behavioral: Negative  Negative for confusion and suicidal ideas  Past Medical History:   Diagnosis Date    Anxiety     Depression        Past Surgical History:   Procedure Laterality Date    WISDOM TOOTH EXTRACTION         Family History   Problem Relation Age of Onset    Hypertension Family         HIGH BLOOD PRESSURE       Social History     Occupational History    Not on file   Tobacco Use    Smoking status: Never Smoker    Smokeless tobacco: Never Used   Vaping Use    Vaping Use: Never used   Substance and Sexual Activity    Alcohol use: Yes    Drug use: Never    Sexual activity: Not on file         Current Outpatient Medications:     escitalopram (LEXAPRO) 10 mg tablet, Take 1 tablet (10 mg total) by mouth daily (Patient not taking: Reported on 6/8/2021), Disp: 90 tablet, Rfl: 1    Allergies   Allergen Reactions    Penicillins Dizziness     Reaction Date: [id number]; Objective:  Vitals:    08/12/21 1509   BP: 144/97   Pulse: 77       Right Hand Exam     Tenderness   Right hand tenderness location: Tenderness ulnar aspect of thumb MCP joint  Other   Erythema: absent  Sensation: normal  Pulse: present    Comments:  Laxity of UCL of MCP joint of thumb  Physical Exam  Constitutional:       General: He is not in acute distress  Appearance: He is well-developed  HENT:      Head: Normocephalic and atraumatic  Eyes:      General: No scleral icterus  Conjunctiva/sclera: Conjunctivae normal    Neck:      Vascular: No JVD  Cardiovascular:      Rate and Rhythm: Normal rate  Pulmonary:      Effort: Pulmonary effort is normal  No respiratory distress     Skin: General: Skin is warm  Neurological:      Mental Status: He is alert and oriented to person, place, and time  Coordination: Coordination normal          I have personally reviewed pertinent films in PACS and my interpretation is as follows:  Xrays right thumb: Avulsion fracture ulnar aspect of proximal phalanx proximally at MCP joint

## 2021-08-13 ENCOUNTER — TELEPHONE (OUTPATIENT)
Dept: OBGYN CLINIC | Facility: CLINIC | Age: 36
End: 2021-08-13

## 2021-08-13 NOTE — TELEPHONE ENCOUNTER
I did call the patient this morning with his MRI results of his right thumb  This showed a UCL tear as well as avulsion fracture, which was present on x-rays yesterday  I did speak to Dr Graciela Yi this morning, who relate to me that sometimes this can be treated with casting, but more often than not, this is operative  I did relay this to the patient today  I explained that our office would call him this morning to schedule him an appointment with Dr Graciela Yi for next week  Tumor Depth: Less than 6mm from granular layer and no invasion beyond the subcutaneous fat

## 2021-08-19 ENCOUNTER — OFFICE VISIT (OUTPATIENT)
Dept: OBGYN CLINIC | Facility: CLINIC | Age: 36
End: 2021-08-19
Payer: COMMERCIAL

## 2021-08-19 ENCOUNTER — DOCUMENTATION (OUTPATIENT)
Dept: OBGYN CLINIC | Facility: CLINIC | Age: 36
End: 2021-08-19

## 2021-08-19 ENCOUNTER — APPOINTMENT (OUTPATIENT)
Dept: RADIOLOGY | Facility: CLINIC | Age: 36
End: 2021-08-19
Payer: COMMERCIAL

## 2021-08-19 ENCOUNTER — TELEPHONE (OUTPATIENT)
Dept: OBGYN CLINIC | Facility: HOSPITAL | Age: 36
End: 2021-08-19

## 2021-08-19 VITALS
SYSTOLIC BLOOD PRESSURE: 142 MMHG | HEIGHT: 73 IN | HEART RATE: 71 BPM | BODY MASS INDEX: 32.6 KG/M2 | DIASTOLIC BLOOD PRESSURE: 94 MMHG | WEIGHT: 246 LBS

## 2021-08-19 DIAGNOSIS — S63.641A SPRAIN OF METACARPOPHALANGEAL (MCP) JOINT OF RIGHT THUMB, INITIAL ENCOUNTER: ICD-10-CM

## 2021-08-19 DIAGNOSIS — M79.644 THUMB PAIN, RIGHT: ICD-10-CM

## 2021-08-19 DIAGNOSIS — M79.644 THUMB PAIN, RIGHT: Primary | ICD-10-CM

## 2021-08-19 PROCEDURE — 73140 X-RAY EXAM OF FINGER(S): CPT

## 2021-08-19 PROCEDURE — 99213 OFFICE O/P EST LOW 20 MIN: CPT | Performed by: ORTHOPAEDIC SURGERY

## 2021-08-19 PROCEDURE — 3008F BODY MASS INDEX DOCD: CPT | Performed by: ORTHOPAEDIC SURGERY

## 2021-08-19 PROCEDURE — 1036F TOBACCO NON-USER: CPT | Performed by: ORTHOPAEDIC SURGERY

## 2021-08-19 NOTE — TELEPHONE ENCOUNTER
Dr Trell Pino: work      Patient states work will not accommodate light duty   Patient needs a new letter stating patient can not return to work

## 2021-08-19 NOTE — LETTER
August 19, 2021     Patient: Sudarshan Love   YOB: 1985   Date of Visit: 8/19/2021       To Whom it May Concern:    Sudarshan Love is under my professional care  He was seen in my office on 8/19/2021  He may return to work on light duty with no use of his RUE  If you have any questions or concerns, please don't hesitate to call           Sincerely,          Aleyda Larson, DO

## 2021-08-19 NOTE — PROGRESS NOTES
Assessment/Plan:  1  Thumb pain, right  XR thumb right first digit-min 2v   2  Bony Skiier's Thumb, Right        I had a long discussion with the patient  He has relatively nondisplaced bony skiers thumb  The thumb is well aligned  The fracture is well aligned for healing  Based on the x-rays taken today in the MRI did not feel the patient needs surgery  The MRI did not demonstrate a Stener lesion  We had a long discussion regarding surgical nonsurgical options  I do feel the patient will heal well in a cast   He was placed in a thumb spica cast today  He will wear this over the next 4 weeks  He will return 4 weeks for repeat x-ray and cast removal prior to x-ray  We did discuss that he may need a surgery if this does not heal properly  He did understand this  He will be out of work with this hand  He will return in 4 weeks  All questions were answered  Subjective: R thumb pain    Patient ID: Sudarshan Love is a 39 y o  male  HPI  Patient is a  70-year-old male presents to the office a was right thumb pain  Approximately 2 weeks ago was playing softball when he sprained his right thumb  He was holding the softball and his hand went to tag another runner and he felt this thumb bent to the side  He noted pain at that time  Despite this he continued to work as a   He really did did not noted any dysfunction only was working  He did have some pain over the inner aspect  Denies any numbness or tingling  Review of Systems   Constitutional: Negative for chills, fever and unexpected weight change  HENT: Negative for hearing loss, nosebleeds and sore throat  Eyes: Negative for pain, redness and visual disturbance  Respiratory: Negative for cough, shortness of breath and wheezing  Cardiovascular: Negative for chest pain, palpitations and leg swelling  Gastrointestinal: Negative for abdominal pain, nausea and vomiting  Endocrine: Negative for polyphagia and polyuria  Genitourinary: Negative for dysuria and hematuria  Musculoskeletal:        See HPI   Skin: Negative for rash and wound  Neurological: Negative for dizziness, numbness and headaches  Psychiatric/Behavioral: Negative for decreased concentration and suicidal ideas  The patient is not nervous/anxious  Past Medical History:   Diagnosis Date    Anxiety     Depression        Past Surgical History:   Procedure Laterality Date    WISDOM TOOTH EXTRACTION         Family History   Problem Relation Age of Onset    Hypertension Family         HIGH BLOOD PRESSURE       Social History     Occupational History    Not on file   Tobacco Use    Smoking status: Never Smoker    Smokeless tobacco: Never Used   Vaping Use    Vaping Use: Never used   Substance and Sexual Activity    Alcohol use: Yes    Drug use: Never    Sexual activity: Not on file         Current Outpatient Medications:     escitalopram (LEXAPRO) 10 mg tablet, Take 1 tablet (10 mg total) by mouth daily (Patient not taking: Reported on 6/8/2021), Disp: 90 tablet, Rfl: 1    Allergies   Allergen Reactions    Penicillins Dizziness     Reaction Date: [id number]; Objective:  Vitals:    08/19/21 0829   BP: 142/94   Pulse: 71       Body mass index is 32 46 kg/m²  Ortho Exam      Right thumb   Patient tender over the medial aspect of the MCP   With radial ulnar stress at 0 and 30 no excessive gapping  Compartment soft   Brisk cap refill   Sensation intact    near full range of motion of the thumb   Minimal edema       Physical Exam  Vitals reviewed  Constitutional:       Appearance: He is well-developed  HENT:      Head: Normocephalic and atraumatic  Eyes:      Conjunctiva/sclera: Conjunctivae normal       Pupils: Pupils are equal, round, and reactive to light  Cardiovascular:      Rate and Rhythm: Normal rate  Pulmonary:      Effort: Pulmonary effort is normal  No respiratory distress     Musculoskeletal:      Cervical back: Normal range of motion and neck supple  Comments: As noted in HPI   Skin:     General: Skin is warm and dry  Neurological:      Mental Status: He is alert and oriented to person, place, and time  Psychiatric:         Behavior: Behavior normal          I have personally reviewed pertinent films in PACS  X-rays and MRI is reviewed  MRI was reviewed which demonstrates a bony skiers thumb lesion  The fracture is nondisplaced  Repeat x-rays taken today demonstrate minimally displaced base of proximal phalanx over the ulnar aspect consistent with bony skiers thumb    There is no malalignment of the thumb

## 2021-08-31 ENCOUNTER — TELEPHONE (OUTPATIENT)
Dept: OBGYN CLINIC | Facility: CLINIC | Age: 36
End: 2021-08-31

## 2021-08-31 NOTE — TELEPHONE ENCOUNTER
Pt called in to check the status of his disability form  Pt stated the insurance company has not received anything    Pt will bring in a copy of the form that needs be filled out by Dr Rosalva Hutchins

## 2021-09-01 NOTE — TELEPHONE ENCOUNTER
I spoke to patient, the state website is currently down and not working    I will go ahead and try again tomorrow AM      Patient understood and had no further questions or concerns

## 2021-09-01 NOTE — TELEPHONE ENCOUNTER
Patient called stating he was advised by our [address] [place] [address] office he'd be receiving a call today about his disability paperwork  Patient is requesting a call back with a status update      Call back # [phone]

## 2021-09-02 ENCOUNTER — TELEPHONE (OUTPATIENT)
Dept: OBGYN CLINIC | Facility: CLINIC | Age: 36
End: 2021-09-02

## 2021-09-02 NOTE — TELEPHONE ENCOUNTER
Patient called in , he wants to know if you can fax the two work letters from 08-19 to his employer  He said he tried to export them from [address] but had issues      Fax # [phone]  Attn : Dorota Beckford     C/b # [phone]

## 2021-09-02 NOTE — TELEPHONE ENCOUNTER
Spoke to patient to make aware of his disability being completed and he can  a copy in [place] tomorrow am

## 2021-09-17 ENCOUNTER — APPOINTMENT (OUTPATIENT)
Dept: RADIOLOGY | Facility: CLINIC | Age: 36
End: 2021-09-17
Payer: COMMERCIAL

## 2021-09-17 ENCOUNTER — OFFICE VISIT (OUTPATIENT)
Dept: OBGYN CLINIC | Facility: CLINIC | Age: 36
End: 2021-09-17
Payer: COMMERCIAL

## 2021-09-17 VITALS — BODY MASS INDEX: 32.6 KG/M2 | WEIGHT: 246 LBS | HEIGHT: 73 IN

## 2021-09-17 DIAGNOSIS — S62.524D CLOSED NONDISPLACED FRACTURE OF DISTAL PHALANX OF RIGHT THUMB WITH ROUTINE HEALING, SUBSEQUENT ENCOUNTER: Primary | ICD-10-CM

## 2021-09-17 DIAGNOSIS — M79.644 PAIN OF RIGHT THUMB: ICD-10-CM

## 2021-09-17 PROCEDURE — 99213 OFFICE O/P EST LOW 20 MIN: CPT | Performed by: ORTHOPAEDIC SURGERY

## 2021-09-17 PROCEDURE — 73140 X-RAY EXAM OF FINGER(S): CPT

## 2021-09-17 PROCEDURE — 3008F BODY MASS INDEX DOCD: CPT | Performed by: ORTHOPAEDIC SURGERY

## 2021-09-17 PROCEDURE — 1036F TOBACCO NON-USER: CPT | Performed by: ORTHOPAEDIC SURGERY

## 2021-09-17 NOTE — PROGRESS NOTES
Assessment/Plan:  1  Closed nondisplaced fracture of distal phalanx of right thumb with routine healing, subsequent encounter  Ambulatory referral to PT/OT hand therapy   2  Pain of right thumb  XR thumb right first digit-min 2v       Scribe Attestation    I,:  Kalli Hadley MA am acting as a scribe while in the presence of the attending physician :       I,:  Antonio Allison, DO personally performed the services described in this documentation    as scribed in my presence  :             24-year-old male 4 weeks status post closed treatment for a right nondisplaced skier's thumb  Patient is doing well  X-rays demonstrate good healing  He is non tender on exam  The thumb is stable at 0 and 30  A referral was provided to occupational therapy for range of motion  He will remain nonweightbearing  He can continue to use the thumb spica brace he has at home  He will wear this while out in the community  He can remove the brace while at home  He will remain out of work for the next 2 weeks and a note was provided for this today  He will follow up in 2 weeks for repeat evaluation and repeat x-ray  Subjective: Leeroy Boland is a 39 y o  male who presents to the office today for follow up evaluation 4 weeks s/p closed treatment for a nondisplaced bony skier's thumb  Patient states he is doing well  He has been tolerating the cast well  He denies any pain  He does note stiffness about the thumb and wrist        Review of Systems   Constitutional: Negative for chills and fever  HENT: Negative for drooling and sneezing  Eyes: Negative for redness  Respiratory: Negative for cough and wheezing  Gastrointestinal: Negative for nausea and vomiting  Musculoskeletal: Negative for arthralgias, joint swelling and myalgias  Neurological: Negative for weakness and numbness  Psychiatric/Behavioral: Negative for behavioral problems  The patient is not nervous/anxious            Past Medical History:   Diagnosis Date  Anxiety     Depression        Past Surgical History:   Procedure Laterality Date    WISDOM TOOTH EXTRACTION         Family History   Problem Relation Age of Onset    Hypertension Family         HIGH BLOOD PRESSURE       Social History     Occupational History    Not on file   Tobacco Use    Smoking status: Never Smoker    Smokeless tobacco: Never Used   Vaping Use    Vaping Use: Never used   Substance and Sexual Activity    Alcohol use: Yes    Drug use: Never    Sexual activity: Not on file         Current Outpatient Medications:     escitalopram (LEXAPRO) 10 mg tablet, Take 1 tablet (10 mg total) by mouth daily (Patient not taking: Reported on 6/8/2021), Disp: 90 tablet, Rfl: 1    Allergies   Allergen Reactions    Penicillins Dizziness     Reaction Date: [id number]; Objective: There were no vitals filed for this visit  Ortho Exam     Right thumb    Stable at 0 and 30  NTTP fx site  No wounds secondary to the cast   Compartments soft  Brisk capillary refill  S/m intact median, radial, and ulnar nerve     Physical Exam  Constitutional:       Appearance: He is well-developed  HENT:      Head: Normocephalic and atraumatic  Eyes:      General:         Right eye: No discharge  Left eye: No discharge  Conjunctiva/sclera: Conjunctivae normal    Cardiovascular:      Rate and Rhythm: Normal rate  Pulmonary:      Effort: Pulmonary effort is normal  No respiratory distress  Musculoskeletal:      Cervical back: Normal range of motion and neck supple  Comments: As noted in HPI   Skin:     General: Skin is warm and dry  Neurological:      Mental Status: He is alert and oriented to person, place, and time  Psychiatric:         Behavior: Behavior normal          Thought Content:  Thought content normal          Judgment: Judgment normal          I have personally reviewed pertinent films in PACS and my interpretation is as follows:X-ray right thumb performed in the office today demonstrates good fracture healing

## 2021-09-17 NOTE — LETTER
September 17, 2021     Patient: Driscilla Rubinstein   YOB: 1985   Date of Visit: 9/17/2021       To Whom it May Concern:    Driscilla Rubinstein is under my professional care  He was seen in my office on 9/17/2021  He will remain out of work until cleared by physician  We will reevaluate in the next 2 weeks  If you have any questions or concerns, please don't hesitate to call           Sincerely,          Geetha Mitchell, DO
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
No

## 2021-09-21 ENCOUNTER — EVALUATION (OUTPATIENT)
Dept: OCCUPATIONAL THERAPY | Facility: CLINIC | Age: 36
End: 2021-09-21
Payer: COMMERCIAL

## 2021-09-21 DIAGNOSIS — S62.524D CLOSED NONDISPLACED FRACTURE OF DISTAL PHALANX OF RIGHT THUMB WITH ROUTINE HEALING, SUBSEQUENT ENCOUNTER: ICD-10-CM

## 2021-09-21 PROCEDURE — 97165 OT EVAL LOW COMPLEX 30 MIN: CPT

## 2021-09-21 PROCEDURE — 97110 THERAPEUTIC EXERCISES: CPT

## 2021-09-21 NOTE — PROGRESS NOTES
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 and that he went to tag someone out and that the impact caused a bony skiers thumb  Patient reported that he was casted for four weeks and then was given a brace to wear while out in public  At this time patient referred to therapy for range of motion  Patient reported that he is hesitant to use his hand  "It's gotten better, but it's still a little sore in the area " Patient reported that he is independent with all ADLs  Bertram Ware is a 39 y o  male who presents with Closed nondisplaced fracture of distal phalanx of right thumb with routine healing, bony skier's thumb  Patient tolerated session well  Wyvonna Dakins reported difficulty with activities of daily living secondary to decreased range of motion  Provided home exercise program for thumb and wrist range of motion and desensitization  Patient was able to demonstrate home program past instruction with use of handouts  Patient advised to contact doctor if there is a change of status  Patient advised to discontinue any exercise which cause increased pain  Patient is a good candidate to benefit from skilled occupational therapy to address impairments and return to maximal level of function with minimal symptoms      Impairments: abnormal or restricted ROM, impaired physical strength, lacks appropriate home exercise program and pain with function  Understanding of Dx/Px/POC: good   Prognosis: good    Goals  Short term goals:  Patient to demonstrate understanding of home exercise program in 2 weeks for decreased pain with activities of daily living  Patient to demonstrate increased active range of motion of thumb IP flexion to 58 degrees in 4 weeks to aid in fine motor tasks  Patient to report a decrease in pain by at least 1 point on a 0-10 scale in 2 weeks to aid in dressing    Long term goals:  Patient to demonstrate functional active range of motion for independent ADL's by time of discharge  Patient to demonstrate functional strength for independent ADL's by time of discharge  Patient to demonstrate understanding of final discharge home exercise program by time of discharge    Plan  Patient would benefit from: OT eval and skilled occupational therapy  Planned modality interventions: thermotherapy: hydrocollator packs  Planned therapy interventions: joint mobilization, manual therapy, massage, stretching, therapeutic activities, therapeutic exercise, fine motor coordination training, flexibility and home exercise program  Frequency: 1-2x/week  Duration in weeks: 6  Plan of Care beginning date: 2021  Plan of Care expiration date: 2021  Treatment plan discussed with: patient        Subjective Evaluation    History of Present Illness  Date of onset: 2021  Mechanism of injury: Patient reported that he was plating softball and that he had a ball in his hand and that he went to tag someone out and that the impact caused a bony skiers thumb  Patient reported that he was casted for four weeks and then was given a brace to wear while out in public  At this time patient referred to therapy for range of motion  Patient reported that he is hesitant to use his hand  "It's gotten better, but it's still a little sore in the area " Patient reported that he is independent with all ADLs     Quality of life: good    Pain  Current pain ratin  At best pain ratin  At worst pain ratin  Location: right thumb  Quality: sharp and dull ache  Progression: improved    Social Support    Employment status: working (; - not currently working secondary to injury)  Hand dominance: ambidextrous (write with left hand, power with right)    Treatments  Previous treatment: immobilization  Patient Goals  Patient goals for therapy: decreased pain, increased motion, return to work, increased strength and return to sport/leisure activities  Patient goal: To get it strengthened        Objective     Active Range of Motion     Left Wrist   Normal active range of motion    Right Wrist   Normal active range of motion    Left Thumb   Flexion     MP: 51 degrees    DIP: 62 degrees  Extension     DIP: 15 degrees  Palmar Abduction     CMC: 55 degrees  Radial abduction    CMC: 63 degrees    Right Thumb   Flexion     MP: 50    DIP: 53  Extension     DIP: 15  Palmar Abduction    CMC: 56  Radial Abduction    CMC: 60    Swelling     Left Wrist/Hand   Thumb     Proximal: 7 3 cm    Right Wrist/Hand   Thumb     Proximal: 7 5 cm             Precautions: no strengthening    Manuals HEP                        Ther Ex     Wrist flexor/extensor stretch 10 sec x3    Prayer stretch 10 sec x3    AROM wrist x10    A/BROM thumb x10    Educated on desensitization x3 min         Ther Activity                                   Modalities     Moist heat 5 min

## 2021-09-23 ENCOUNTER — TELEPHONE (OUTPATIENT)
Dept: OBGYN CLINIC | Facility: CLINIC | Age: 36
End: 2021-09-23

## 2021-09-23 NOTE — TELEPHONE ENCOUNTER
Estimated RTW was 09/21  Patient was advised to stay out of work until cleared at last apt 09/17  He is fu 10/01  Please advise estimated RTW for state extension  He is a

## 2021-09-29 ENCOUNTER — OFFICE VISIT (OUTPATIENT)
Dept: OCCUPATIONAL THERAPY | Facility: CLINIC | Age: 36
End: 2021-09-29
Payer: COMMERCIAL

## 2021-09-29 DIAGNOSIS — S62.524D CLOSED NONDISPLACED FRACTURE OF DISTAL PHALANX OF RIGHT THUMB WITH ROUTINE HEALING, SUBSEQUENT ENCOUNTER: Primary | ICD-10-CM

## 2021-09-29 PROCEDURE — 97110 THERAPEUTIC EXERCISES: CPT

## 2021-09-29 PROCEDURE — 97530 THERAPEUTIC ACTIVITIES: CPT

## 2021-09-29 NOTE — PROGRESS NOTES
Daily Note     Today's date: 2021  Patient name: Awais Keane  : 1985  MRN: [id number]  Referring provider: Charley Bucio DO  Dx:   Encounter Diagnosis     ICD-10-CM    1  Closed nondisplaced fracture of distal phalanx of right thumb with routine healing, subsequent encounter  S62 [id number]        Start Time: 9664  Stop Time: 6679  Total time in clinic (min): 35 minutes    Subjective: Patient reported that he has been using his hand without difficulty  "Ever now and then it gets tender "      Objective: See treatment diary below       Precautions: no strengthening    Manuals HEP 2021                        Ther Ex     Wrist flexor/extensor stretch 10 sec x3 10 sec x3   Prayer stretch 10 sec x3    AROM wrist x10 x10   A/BROM thumb x10 x10 gentle passive   Educated on desensitization x3 min x5 min        Ther Activity     Small pegs  x20   Dice- in hand  x15   Iron balls  x10   Juxtaciser  x10   Wrist rotator  x10        Modalities     Moist heat 5 min 5 min             Assessment: Patient tolerated session well  Session focused on range of motion and patient education  Patient inquired about utilizing a massage gun to decrease muscle tightness at thenar eminence  Patient was instructed to refrain from utilizing the massage gun on his hand until after the doctor clears him for strengthening  Patient tolerated new range of motion and dexterity exercises without complaints of pain  Patient reported that he wishes to return to work soon  Plan: Focus on range of motion  Begin strengthening when doctor lifts restrictions

## 2021-10-01 ENCOUNTER — OFFICE VISIT (OUTPATIENT)
Dept: OBGYN CLINIC | Facility: CLINIC | Age: 36
End: 2021-10-01
Payer: COMMERCIAL

## 2021-10-01 ENCOUNTER — APPOINTMENT (OUTPATIENT)
Dept: RADIOLOGY | Facility: CLINIC | Age: 36
End: 2021-10-01
Payer: COMMERCIAL

## 2021-10-01 VITALS
HEIGHT: 73 IN | BODY MASS INDEX: 32.6 KG/M2 | HEART RATE: 81 BPM | SYSTOLIC BLOOD PRESSURE: 138 MMHG | DIASTOLIC BLOOD PRESSURE: 99 MMHG | WEIGHT: 246 LBS

## 2021-10-01 DIAGNOSIS — M79.644 PAIN OF RIGHT THUMB: ICD-10-CM

## 2021-10-01 DIAGNOSIS — S62.524D CLOSED NONDISPLACED FRACTURE OF DISTAL PHALANX OF RIGHT THUMB WITH ROUTINE HEALING, SUBSEQUENT ENCOUNTER: Primary | ICD-10-CM

## 2021-10-01 PROCEDURE — 99213 OFFICE O/P EST LOW 20 MIN: CPT | Performed by: ORTHOPAEDIC SURGERY

## 2021-10-01 PROCEDURE — 73140 X-RAY EXAM OF FINGER(S): CPT

## 2021-10-01 PROCEDURE — 3008F BODY MASS INDEX DOCD: CPT | Performed by: ORTHOPAEDIC SURGERY

## 2021-10-01 PROCEDURE — 1036F TOBACCO NON-USER: CPT | Performed by: ORTHOPAEDIC SURGERY

## 2021-10-06 ENCOUNTER — OFFICE VISIT (OUTPATIENT)
Dept: OCCUPATIONAL THERAPY | Facility: CLINIC | Age: 36
End: 2021-10-06
Payer: COMMERCIAL

## 2021-10-06 DIAGNOSIS — S62.524D CLOSED NONDISPLACED FRACTURE OF DISTAL PHALANX OF RIGHT THUMB WITH ROUTINE HEALING, SUBSEQUENT ENCOUNTER: Primary | ICD-10-CM

## 2021-10-06 PROCEDURE — 97530 THERAPEUTIC ACTIVITIES: CPT

## 2021-10-06 PROCEDURE — 97110 THERAPEUTIC EXERCISES: CPT

## 2021-11-01 ENCOUNTER — TELEPHONE (OUTPATIENT)
Dept: FAMILY MEDICINE CLINIC | Facility: CLINIC | Age: 36
End: 2021-11-01

## 2021-11-02 ENCOUNTER — OFFICE VISIT (OUTPATIENT)
Dept: FAMILY MEDICINE CLINIC | Facility: CLINIC | Age: 36
End: 2021-11-02
Payer: COMMERCIAL

## 2021-11-02 VITALS
TEMPERATURE: 97.7 F | HEIGHT: 73 IN | DIASTOLIC BLOOD PRESSURE: 90 MMHG | OXYGEN SATURATION: 98 % | SYSTOLIC BLOOD PRESSURE: 132 MMHG | HEART RATE: 79 BPM | BODY MASS INDEX: 32.47 KG/M2 | WEIGHT: 245 LBS | RESPIRATION RATE: 14 BRPM

## 2021-11-02 DIAGNOSIS — E66.9 OBESITY (BMI 30-39.9): ICD-10-CM

## 2021-11-02 DIAGNOSIS — F41.8 SITUATIONAL ANXIETY: Primary | ICD-10-CM

## 2021-11-02 DIAGNOSIS — G47.09 OTHER INSOMNIA: ICD-10-CM

## 2021-11-02 PROBLEM — F41.1 GAD (GENERALIZED ANXIETY DISORDER): Status: RESOLVED | Noted: 2019-08-12 | Resolved: 2021-11-02

## 2021-11-02 PROCEDURE — 99214 OFFICE O/P EST MOD 30 MIN: CPT | Performed by: FAMILY MEDICINE

## 2021-11-02 PROCEDURE — 3008F BODY MASS INDEX DOCD: CPT | Performed by: FAMILY MEDICINE

## 2021-11-02 PROCEDURE — 1036F TOBACCO NON-USER: CPT | Performed by: FAMILY MEDICINE

## 2021-11-02 RX ORDER — ALPRAZOLAM 0.25 MG/1
TABLET ORAL
Qty: 60 TABLET | Refills: 0 | Status: SHIPPED | OUTPATIENT
Start: 2021-11-02

## 2021-11-09 ENCOUNTER — IMMUNIZATIONS (OUTPATIENT)
Dept: FAMILY MEDICINE CLINIC | Facility: HOSPITAL | Age: 36
End: 2021-11-09

## 2021-11-09 DIAGNOSIS — Z23 ENCOUNTER FOR IMMUNIZATION: Primary | ICD-10-CM

## 2021-11-09 PROCEDURE — 91306 COVID-19 MODERNA VACC 0.25 ML BOOSTER: CPT

## 2021-11-09 PROCEDURE — 0013A COVID-19 MODERNA VACC 0.25 ML BOOSTER: CPT

## 2022-01-17 ENCOUNTER — TELEPHONE (OUTPATIENT)
Dept: FAMILY MEDICINE CLINIC | Facility: CLINIC | Age: 37
End: 2022-01-17

## 2022-04-29 ENCOUNTER — OFFICE VISIT (OUTPATIENT)
Dept: URGENT CARE | Facility: CLINIC | Age: 37
End: 2022-04-29
Payer: COMMERCIAL

## 2022-04-29 VITALS
BODY MASS INDEX: 31.14 KG/M2 | HEART RATE: 82 BPM | OXYGEN SATURATION: 98 % | DIASTOLIC BLOOD PRESSURE: 98 MMHG | TEMPERATURE: 97.6 F | HEIGHT: 73 IN | SYSTOLIC BLOOD PRESSURE: 140 MMHG | RESPIRATION RATE: 18 BRPM | WEIGHT: 235 LBS

## 2022-04-29 DIAGNOSIS — H92.01 RIGHT EAR PAIN: Primary | ICD-10-CM

## 2022-04-29 PROCEDURE — 99213 OFFICE O/P EST LOW 20 MIN: CPT | Performed by: PHYSICIAN ASSISTANT

## 2022-04-29 RX ORDER — NEOMYCIN SULFATE, POLYMYXIN B SULFATE AND HYDROCORTISONE 10; 3.5; 1 MG/ML; MG/ML; [USP'U]/ML
4 SUSPENSION/ DROPS AURICULAR (OTIC) 4 TIMES DAILY
Qty: 10 ML | Refills: 0 | Status: SHIPPED | OUTPATIENT
Start: 2022-04-29

## 2022-04-29 RX ORDER — PSEUDOEPHEDRINE HCL 120 MG/1
120 TABLET, FILM COATED, EXTENDED RELEASE ORAL 2 TIMES DAILY
Qty: 14 TABLET | Refills: 0 | Status: SHIPPED | OUTPATIENT
Start: 2022-04-29

## 2022-04-29 NOTE — PATIENT INSTRUCTIONS
Use eardrops as prescribed  Can take over-the-counter ibuprofen or Tylenol as needed for discomfort  Follow with PC P  Return or be seen in the ER with any progressing or worsening symptoms

## 2022-04-29 NOTE — PROGRESS NOTES
[facility]        NAME: Madison Miller is a 44 y o  male  : 1985    MRN: [id number]  DATE: 2022  TIME: 5:53 PM    Assessment and Plan   Right ear pain [H92 01]  1  Right ear pain  neomycin-polymyxin-hydrocortisone (CORTISPORIN) 0 35%-10,000 units/mL-1% otic suspension    pseudoephedrine (SUDAFED) 120 MG 12 hr tablet         Patient Instructions     Patient Instructions   Use eardrops as prescribed  Can take over-the-counter ibuprofen or Tylenol as needed for discomfort  Follow with PC P  Return or be seen in the ER with any progressing or worsening symptoms  Follow up with PCP in 3-5 days  Proceed to  ER if symptoms worsen  Chief Complaint     Chief Complaint   Patient presents with    Earache     Right sided ear pain         History of Present Illness       Patient is a 78-year-old male presenting today with right ear pain x1 week  Patient notes a couple weeks ago he was having cold-like symptoms, notes that those symptoms have since resolved but is still experiencing some pain and discomfort of his right ear, has not taken any medications or alleviating factors  Denies fever, chills, ear discharge or drainage, hearing loss  Review of Systems   Review of Systems   Constitutional: Negative for chills and fever  HENT: Positive for ear pain  Negative for ear discharge, hearing loss and sore throat  Eyes: Negative for pain and visual disturbance  Respiratory: Negative for cough and shortness of breath  Cardiovascular: Negative for chest pain and palpitations  Gastrointestinal: Negative for abdominal pain and vomiting  Genitourinary: Negative for dysuria and hematuria  Musculoskeletal: Negative for arthralgias and back pain  Skin: Negative for color change and rash  Neurological: Negative for seizures and syncope  All other systems reviewed and are negative          Current Medications       Current Outpatient Medications:     ALPRAZolam Darialyndon Stubbsl) 0 25 mg tablet, 1-2 po qh6rs prn anxiety, Disp: 60 tablet, Rfl: 0    neomycin-polymyxin-hydrocortisone (CORTISPORIN) 0 35%-10,000 units/mL-1% otic suspension, Administer 4 drops to the right ear 4 (four) times a day, Disp: 10 mL, Rfl: 0    pseudoephedrine (SUDAFED) 120 MG 12 hr tablet, Take 1 tablet (120 mg total) by mouth 2 (two) times a day, Disp: 14 tablet, Rfl: 0    Current Allergies     Allergies as of 04/29/2022 - Reviewed 04/29/2022   Allergen Reaction Noted    Penicillins Dizziness 11/22/2010            The following portions of the patient's history were reviewed and updated as appropriate: allergies, current medications, past family history, past medical history, past social history, past surgical history and problem list      Past Medical History:   Diagnosis Date    Anxiety     Depression        Past Surgical History:   Procedure Laterality Date    WISDOM TOOTH EXTRACTION         Family History   Problem Relation Age of Onset    Hypertension Family         HIGH BLOOD PRESSURE         Medications have been verified  Objective   /98   Pulse 82   Temp 97 6 °F (36 4 °C)   Resp 18   Ht 6' 1" (1 854 m)   Wt 107 kg (235 lb)   SpO2 98%   BMI 31 00 kg/m²        Physical Exam     Physical Exam  Vitals reviewed  Constitutional:       General: He is not in acute distress  Appearance: He is well-developed  He is not toxic-appearing  HENT:      Head: Normocephalic and atraumatic  Right Ear: Tympanic membrane and external ear normal       Left Ear: Tympanic membrane, ear canal and external ear normal       Ears:      Comments: Mild redness and swelling of right ear canal, no discharge drainage, hearing normal     Nose: Congestion present  Comments: Inflamed nasal mucosa     Mouth/Throat:      Mouth: Mucous membranes are moist       Pharynx: Oropharynx is clear     Eyes:      Conjunctiva/sclera: Conjunctivae normal    Cardiovascular:      Rate and Rhythm: Normal rate and regular rhythm  Pulses: Normal pulses  Pulmonary:      Effort: Pulmonary effort is normal       Breath sounds: Normal breath sounds  Musculoskeletal:      Cervical back: Normal range of motion  No tenderness  Lymphadenopathy:      Cervical: No cervical adenopathy  Skin:     General: Skin is warm  Capillary Refill: Capillary refill takes less than 2 seconds  Neurological:      General: No focal deficit present  Mental Status: He is alert and oriented to person, place, and time

## 2022-12-28 ENCOUNTER — TELEPHONE (OUTPATIENT)
Dept: FAMILY MEDICINE CLINIC | Facility: CLINIC | Age: 37
End: 2022-12-28

## 2022-12-28 ENCOUNTER — OFFICE VISIT (OUTPATIENT)
Dept: FAMILY MEDICINE CLINIC | Facility: CLINIC | Age: 37
End: 2022-12-28

## 2022-12-28 VITALS
HEART RATE: 100 BPM | SYSTOLIC BLOOD PRESSURE: 146 MMHG | TEMPERATURE: 97.9 F | OXYGEN SATURATION: 98 % | RESPIRATION RATE: 18 BRPM | BODY MASS INDEX: 27.57 KG/M2 | DIASTOLIC BLOOD PRESSURE: 100 MMHG | WEIGHT: 209 LBS

## 2022-12-28 DIAGNOSIS — F41.1 GAD (GENERALIZED ANXIETY DISORDER): Primary | ICD-10-CM

## 2022-12-28 DIAGNOSIS — I10 ESSENTIAL HYPERTENSION: ICD-10-CM

## 2022-12-28 DIAGNOSIS — F32.2 SEVERE DEPRESSION (HCC): ICD-10-CM

## 2022-12-28 RX ORDER — LOSARTAN POTASSIUM 25 MG/1
25 TABLET ORAL DAILY
Qty: 30 TABLET | Refills: 1 | Status: SHIPPED | OUTPATIENT
Start: 2022-12-28

## 2022-12-28 RX ORDER — HYDROXYZINE PAMOATE 25 MG/1
25 CAPSULE ORAL 2 TIMES DAILY PRN
Qty: 60 CAPSULE | Refills: 1 | Status: SHIPPED | OUTPATIENT
Start: 2022-12-28

## 2022-12-28 RX ORDER — SERTRALINE HYDROCHLORIDE 25 MG/1
25 TABLET, FILM COATED ORAL DAILY
Qty: 30 TABLET | Refills: 1 | Status: SHIPPED | OUTPATIENT
Start: 2022-12-28

## 2022-12-28 NOTE — ASSESSMENT & PLAN NOTE
Will start on zoloft and advised on suicidal precautions and advised to go to ER for any suicidal ideation and thoughts   Will take vistaril as needed for panic attacks or worsening of anxeity

## 2022-12-28 NOTE — TELEPHONE ENCOUNTER
Called and spoke with patient  He just recently started a new job and is feeling extremely stressed and anxious  He is also having some obsessive depressing thoughts  I clarified with him that he is not having any thoughts of hurting himself or others  I scheduled him w/ Eddie Dakin NP 11:15 today  He said he is open to new ideas about therapy, natural remedies, and possibly new medications to which I stated there are no promises of new medications today but we will definitely try to help him any way we can  Also want to note that pt stated he tried calling [facility] to make a new patient appointment and they have not gotten back to him about anything  Sending as Giuliano Espinoza

## 2022-12-28 NOTE — TELEPHONE ENCOUNTER
[facility] Woodward is calling to get an apt with Dr Hoda Elder today  He stated he is having a lot of anxiety and depression    Can we triage patient to see if this can wait until apt is available     Thank you

## 2022-12-28 NOTE — LETTER
December 28, 2022     Patient: Jeanine Tomlinson  YOB: 1985  Date of Visit: 12/28/2022      To Whom it May Concern:    Jeanine Tomlinson is under my professional care  Erika Habermann was seen in my office on 12/28/2022  Erika Habermann may return to work on 12/30/2022  If you have any questions or concerns, please don't hesitate to call           Sincerely,          CELINE Mathis        CC: No Recipients

## 2022-12-28 NOTE — PATIENT INSTRUCTIONS
Hydroxyzine (By mouth)   Hydroxyzine Pamoate (xzz-ZPSO-a-zeen LARRY-oh-ate)  Treats anxiety, nausea, vomiting, allergies, skin rash, hives, and itching  May also be used with anesthesia for medical procedures  Brand Name(s): Vistaril   There may be other brand names for this medicine  When This Medicine Should Not Be Used: This medicine is not right for everyone  Do not use it if you had an allergic reaction to hydroxyzine, cetirizine, or levocetirizine  Do not use it during the early part of pregnancy or if you have prolonged QT interval   How to Use This Medicine:   Capsule, Liquid, Tablet  Your doctor will tell you how much medicine to use  Do not use more than directed  Oral liquid: Measure the oral liquid medicine with a marked measuring spoon, oral syringe, or medicine cup  Shake well just before use  Tablet: Swallow whole  Do not break, crush, or chew it  Missed dose: Take a dose as soon as you remember  If it is almost time for your next dose, wait until then and take a regular dose  Do not take extra medicine to make up for a missed dose  Store the medicine in a closed container at room temperature, away from heat, moisture, and direct light  Drugs and Foods to Avoid:   Ask your doctor or pharmacist before using any other medicine, including over-the-counter medicines, vitamins, and herbal products  Some medicines can affect how hydroxyzine works  Tell your doctor if you are using any of the following:  Droperidol, methadone, ondansetron, pentamidine  Antibiotic (including azithromycin, clarithromycin, erythromycin, gatifloxacin, moxifloxacin)  Medicine to treat depression (including citalopram, fluoxetine)  Medicine to treat heart rhythm problems (including amiodarone, procainamide, quinidine, sotalol)  Medicine to treat mental health problems (including chlorpromazine, clozapine, iloperidone, quetiapine, ziprasidone)  Tell your doctor if you use anything else that makes you sleepy   Some examples are allergy medicine, narcotic pain medicine, and alcohol  Warnings While Using This Medicine:   Tell your doctor if you are pregnant or breastfeeding, or if you have heart disease, heart failure, a slow heartbeat, skin problems, or had a recent heart attack  This medicine may cause the following problems:  Changes in your heart rhythm  Serious skin reaction called acute generalized exanthematous pustulosis (AGEP)  This medicine may make you drowsy  Do not drive or do anything that could be dangerous until you know how this medicine affects you  Call your doctor if your symptoms do not improve or if they get worse  Keep all medicine out of the reach of children  Never share your medicine with anyone  Possible Side Effects While Using This Medicine:   Call your doctor right away if you notice any of these side effects: Allergic reaction: Itching or hives, swelling in your face or hands, swelling or tingling in your mouth or throat, chest tightness, trouble breathing  Fainting, dizziness, lightheadedness  Fast, pounding, or uneven heartbeat  Fever, skin rash  Severe tiredness  If you notice these less serious side effects, talk with your doctor:   Drowsiness, sleepiness  Dry mouth  If you notice other side effects that you think are caused by this medicine, tell your doctor  Call your doctor for medical advice about side effects  You may report side effects to FDA at 4-430-FDA-2517    © Copyright [facility] 2022 Information is for End User's use only and may not be sold, redistributed or otherwise used for commercial purposes  The above information is an  only  It is not intended as medical advice for individual conditions or treatments  Talk to your doctor, nurse or pharmacist before following any medical regimen to see if it is safe and effective for you  Losartan (By mouth)   Losartan (brett-ROXI-tan)  Treats high blood pressure   Reduces the risk of stroke in patients with high blood pressure and an enlarged heart  Treats kidney disease in patients with diabetes  This medicine is an angiotensin receptor blocker (ARB)  Brand Name(s): Cozaar   There may be other brand names for this medicine  When This Medicine Should Not Be Used: This medicine is not right for everyone  Do not use it if you had an allergic reaction to losartan, or if you are pregnant  Do not use this medicine together with aliskiren if you have diabetes  How to Use This Medicine:   Tablet  Take your medicine as directed  Your dose may need to be changed several times to find what works best for you  Drink plenty of fluids if you exercise, sweat more than usual, or have diarrhea or vomiting  Read and follow the patient instructions that come with this medicine  Talk to your doctor or pharmacist if you have any questions  Missed dose: Take a dose as soon as you remember  If it is almost time for your next dose, wait until then and take a regular dose  Do not take extra medicine to make up for a missed dose  Store the medicine in a closed container at room temperature, away from heat, moisture, and direct light  Drugs and Foods to Avoid:   Ask your doctor or pharmacist before using any other medicine, including over-the-counter medicines, vitamins, and herbal products  Some medicines can affect how losartan works  Tell your doctor if you are using any of the following:   Lithium  Rifampin  A diuretic (water pill), such as spironolactone, triamterene, or amiloride  NSAID pain or arthritis medicine, such as aspirin, diclofenac, ibuprofen, or naproxen  Another blood pressure medicine, such as aliskiren, benazepril, enalapril, or lisinopril  Ask your doctor before you use any medicine, supplement, or salt substitute that contains potassium  Warnings While Using This Medicine: It is not safe to take this medicine during pregnancy  It could harm an unborn baby  Tell your doctor right away if you become pregnant    Tell your doctor if you are breastfeeding, or if you have kidney disease, liver disease, congestive heart failure, or diabetes  Tell your doctor if you have had angioedema that was caused by a blood pressure medicine  This medicine could lower your blood pressure too much, especially when you first use it or if you are dehydrated  Stand or sit up slowly if you feel lightheaded or dizzy  Your doctor will do lab tests at regular visits to check on the effects of this medicine  Keep all appointments  Keep all medicine out of the reach of children  Never share your medicine with anyone  Possible Side Effects While Using This Medicine:   Call your doctor right away if you notice any of these side effects: Allergic reaction: Itching or hives, swelling in your face or hands, swelling or tingling in your mouth or throat, chest tightness, trouble breathing  Change in how much or how often you urinate  Confusion, weakness, uneven heartbeat, trouble breathing, numbness or tingling in your hands, feet, or lips  Lightheadedness, dizziness, fainting  Rapid weight gain, swelling in your hands, ankles, or feet  If you notice these less serious side effects, talk with your doctor:   Diarrhea  Tiredness  If you notice other side effects that you think are caused by this medicine, tell your doctor  Call your doctor for medical advice about side effects  You may report side effects to FDA at 5-563-FDA-9022    © Copyright [employer] 2022 Information is for End User's use only and may not be sold, redistributed or otherwise used for commercial purposes  The above information is an  only  It is not intended as medical advice for individual conditions or treatments  Talk to your doctor, nurse or pharmacist before following any medical regimen to see if it is safe and effective for you    Sertraline (By mouth)   Sertraline (SER-tra-girish)  Treats depression, obsessive-compulsive disorder (OCD), posttraumatic stress disorder (PTSD), premenstrual dysphoric disorder (PMDD), social anxiety disorder, and panic disorder  This medicine is an SSRI  Brand Name(s): Zoloft   There may be other brand names for this medicine  When This Medicine Should Not Be Used: This medicine is not right for everyone  Do not use it if you had an allergic reaction to sertraline  How to Use This Medicine:   Liquid, Tablet  Take your medicine as directed  Your dose may need to be changed several times to find what works best for you  You may need to take it for a few weeks or months before you feel better  Oral liquid: Use the dropper provided to remove the medicine and mix it with 1/2 cup (4 ounces) of water, ginger ale, lemon-lime soda, lemonade, or orange juice  Drink the mixture right away  It is normal for it to look a bit hazy  This medicine should come with a Medication Guide  Ask your pharmacist for a copy if you do not have one  Missed dose: Take a dose as soon as you remember  If it is almost time for your next dose, wait until then and take a regular dose  Do not take extra medicine to make up for a missed dose  Store the medicine in a closed container at room temperature, away from heat, moisture, and direct light  Drugs and Foods to Avoid:   Ask your doctor or pharmacist before using any other medicine, including over-the-counter medicines, vitamins, and herbal products  Do not use this medicine together with pimozide  Do not use this medicine and an MAO inhibitor (MAOI) within 14 days of each other  Do not use the oral liquid form of sertraline if you are also using disulfiram   Some medicines can affect how sertraline works   Tell your doctor if you are using the following:   Atomoxetine, buspirone, cimetidine, cisapride, desipramine, dextromethorphan, fentanyl, flecainide, lithium, methadone, metoprolol, nebivolol, perphenazine, phenytoin, propafenone, Kwesi's wort, tacrolimus, thoridazine, tolterodine, tramadol, venlafaxine  Blood thinner (including warfarin)  Diuretic (water pill)  Medicine to treat infection (including erythromycin, gatifloxacin, moxifloxacin)  NSAID pain or arthritis medicine (including aspirin, celecoxib, diclofenac, ibuprofen, naproxen)  Triptan medicine for migraine headaches  Tryptophan supplements  Do not drink alcohol while you are using this medicine  Warnings While Using This Medicine:   Tell your doctor if you are pregnant or breastfeeding, or if you have liver disease, bleeding problems, glaucoma, heart disease, heart rhythm problems, or a history of debbie, seizure disorder, or stroke  For some children, teenagers, and young adults, this medicine may increase mental or emotional problems  This may lead to thoughts of suicide and violence  Talk with your doctor right away if you have any thoughts or behavior changes that concern you  Tell your doctor if you or anyone in your family has a history of bipolar disorder or suicide attempts  This medicine may cause the following problems:   Serotonin syndrome (may be life-threatening when used with certain other medicines)  Increased risk of bleeding problems  Heart rhythm problems, including QT prolongation, torsades de pointes  Sexual problems  Tell your doctor if you are sensitive to latex, because the oral liquid comes with a latex rubber dropper  This medicine may make you dizzy or drowsy  Do not drive or do anything that could be dangerous until you know how this medicine affects you  Do not stop using this medicine suddenly  Your doctor will need to slowly decrease your dose before you stop it completely  Tell any doctor or dentist who treats you that you are using this medicine  This medicine may affect certain medical test results  Your doctor will do lab tests at regular visits to check on the effects of this medicine  Keep all appointments  Keep all medicine out of the reach of children  Never share your medicine with anyone    Possible Side Effects While Using This Medicine:   Call your doctor right away if you notice any of these side effects: Allergic reaction: Itching or hives, swelling in your face or hands, swelling or tingling in your mouth or throat, chest tightness, trouble breathing  Anxiety, restlessness, fast heartbeat, fever, sweating, muscle spasms, twitching, nausea, vomiting, diarrhea, seeing or hearing things that are not there  Blistering, peeling, or red skin rash  Confusion, weakness, and muscle twitching  Eye pain, vision changes, seeing halos around lights  Feeling more excited or energetic than usual  Loss in sexual ability, desire, drive, or performance, delayed or inability to have an orgasm, inability to have or keep an erection  Thoughts of hurting yourself or others, unusual mood or behavior  Unusual bleeding or bruising  If you notice these less serious side effects, talk with your doctor:   Constipation, mild diarrhea, loss of appetite, weight loss  Dry mouth  Nausea, vomiting  Trouble sleeping  If you notice other side effects that you think are caused by this medicine, tell your doctor  Call your doctor for medical advice about side effects  You may report side effects to FDA at 8-337-FDA-6685    © Copyright [employer] 2022 Information is for End User's use only and may not be sold, redistributed or otherwise used for commercial purposes  The above information is an  only  It is not intended as medical advice for individual conditions or treatments  Talk to your doctor, nurse or pharmacist before following any medical regimen to see if it is safe and effective for you

## 2022-12-28 NOTE — PROGRESS NOTES
Assessment/Plan:    1  CHA (generalized anxiety disorder)  Assessment & Plan: Will start on zoloft and advised on suicidal precautions and advised to go to ER for any suicidal ideation and thoughts  Will take vistaril as needed for panic attacks or worsening of anxeity    Orders:  -     sertraline (Zoloft) 25 mg tablet; Take 1 tablet (25 mg total) by mouth daily  -     hydrOXYzine pamoate (VISTARIL) 25 mg capsule; Take 1 capsule (25 mg total) by mouth 2 (two) times a day as needed for anxiety    2  Severe depression (HCC)  -     sertraline (Zoloft) 25 mg tablet; Take 1 tablet (25 mg total) by mouth daily    3  Essential hypertension  Assessment & Plan:  Started on losartan 25 mg and will follow back in office 3 to 4 weeks or early if needed    Orders:  -     losartan (COZAAR) 25 mg tablet; Take 1 tablet (25 mg total) by mouth daily              Patient Instructions:  Supportive care discussed and advised  Advised to RTO for any worsening and no improvement  Follow up for no improvement and worsening of conditions  Patient advised and educated when to see immediate medical care  Return in about 1 month (around 1/28/2023)  Future Appointments   Date Time Provider Dmeetrius Gomez   1/30/2023  4:45 PM DO ESTELITA Piña [facility]           Subjective:      Patient ID: Nedra Chen is a 44 y o  male  Chief Complaint   Patient presents with   • Anxiety   • Depression     Sas/cma         Vitals:  /100   Pulse 100   Temp 97 9 °F (36 6 °C)   Resp 18   Wt 94 8 kg (209 lb)   SpO2 98%   BMI 27 57 kg/m²   Wt Readings from Last 3 Encounters:   12/28/22 94 8 kg (209 lb)   04/29/22 107 kg (235 lb)   11/02/21 111 kg (245 lb)      HPI  Patient stated that dealing with anxiety and depression issues from many years on and off and also been having elevated BP from years  Stated that changed new job as was having anxiety issues and thought would help but made it worse   Not sleeping well and cannot concentrate well  Denies any suicidal plans  Tried lexapro in past and thinks did not help much  Also worried that his elevated BP might also be triggering his anxiety too and would like to treat his HTN too  Denies any chest pain, sob, headache and dizziness  PHQ-2/9 Depression Screening    Little interest or pleasure in doing things: 3 - nearly every day  Feeling down, depressed, or hopeless: 3 - nearly every day  Trouble falling or staying asleep, or sleeping too much: 3 - nearly every day  Feeling tired or having little energy: 3 - nearly every day  Poor appetite or overeating: 3 - nearly every day  Feeling bad about yourself - or that you are a failure or have let yourself or your family down: 3 - nearly every day  Trouble concentrating on things, such as reading the newspaper or watching television: 3 - nearly every day  Moving or speaking so slowly that other people could have noticed  Or the opposite - being so fidgety or restless that you have been moving around a lot more than usual: 1 - several days  Thoughts that you would be better off dead, or of hurting yourself in some way: 2 - more than half the days  PHQ-2 Score: 6  PHQ-2 Interpretation: POSITIVE depression screen  PHQ-9 Score: 24   PHQ-9 Interpretation: Severe depression        CHA-7 Flowsheet Screening    Flowsheet Row Most Recent Value   Over the last 2 weeks, how often have you been bothered by any of the following problems?     Feeling nervous, anxious, or on edge 3   Not being able to stop or control worrying 3   Worrying too much about different things 3   Trouble relaxing 3   Being so restless that it is hard to sit still 3   Becoming easily annoyed or irritable 3   Feeling afraid as if something awful might happen 3   CHA-7 Total Score 21              The following portions of the patient's history were reviewed and updated as appropriate: allergies, current medications, past family history, past medical history, past social history, past surgical history and problem list       Review of Systems   HENT: Negative  Respiratory: Negative  Cardiovascular: Negative  Gastrointestinal: Negative  Genitourinary: Negative  Musculoskeletal: Negative  Psychiatric/Behavioral: Positive for decreased concentration, sleep disturbance and suicidal ideas  The patient is nervous/anxious  As noted in HPI           Objective:    Social History     Tobacco Use   Smoking Status Never   Smokeless Tobacco Never       Allergies: Allergies   Allergen Reactions   • Penicillins Dizziness     Reaction Date: [id number];          Current Outpatient Medications   Medication Sig Dispense Refill   • ALPRAZolam (XANAX) 0 25 mg tablet 1-2 po qh6rs prn anxiety 60 tablet 0   • hydrOXYzine pamoate (VISTARIL) 25 mg capsule Take 1 capsule (25 mg total) by mouth 2 (two) times a day as needed for anxiety 60 capsule 1   • losartan (COZAAR) 25 mg tablet Take 1 tablet (25 mg total) by mouth daily 30 tablet 1   • pseudoephedrine (SUDAFED) 120 MG 12 hr tablet Take 1 tablet (120 mg total) by mouth 2 (two) times a day 14 tablet 0   • sertraline (Zoloft) 25 mg tablet Take 1 tablet (25 mg total) by mouth daily 30 tablet 1   • neomycin-polymyxin-hydrocortisone (CORTISPORIN) 0 35%-10,000 units/mL-1% otic suspension Administer 4 drops to the right ear 4 (four) times a day (Patient not taking: Reported on 12/28/2022) 10 mL 0     No current facility-administered medications for this visit  Physical Exam  Constitutional:       Appearance: Normal appearance  HENT:      Head: Normocephalic  Nose: Nose normal    Eyes:      Conjunctiva/sclera: Conjunctivae normal    Cardiovascular:      Rate and Rhythm: Normal rate and regular rhythm  Heart sounds: Normal heart sounds  Pulmonary:      Effort: Pulmonary effort is normal       Breath sounds: Normal breath sounds  Musculoskeletal:         General: No swelling or tenderness  Normal range of motion  Skin:     General: Skin is warm and dry  Findings: No rash  Neurological:      Mental Status: He is alert and oriented to person, place, and time  Psychiatric:         Mood and Affect: Mood is anxious  Affect is tearful  Behavior: Behavior normal          Thought Content:  Thought content normal          Judgment: Judgment normal                      CELINE Driscoll

## 2023-01-23 ENCOUNTER — RA CDI HCC (OUTPATIENT)
Dept: OTHER | Facility: HOSPITAL | Age: 38
End: 2023-01-23

## 2023-01-28 PROBLEM — E66.9 OBESITY (BMI 30-39.9): Status: RESOLVED | Noted: 2019-10-21 | Resolved: 2023-01-28

## 2023-01-30 ENCOUNTER — OFFICE VISIT (OUTPATIENT)
Dept: FAMILY MEDICINE CLINIC | Facility: CLINIC | Age: 38
End: 2023-01-30

## 2023-01-30 VITALS
RESPIRATION RATE: 16 BRPM | WEIGHT: 215.6 LBS | TEMPERATURE: 97.7 F | BODY MASS INDEX: 28.57 KG/M2 | OXYGEN SATURATION: 98 % | HEIGHT: 73 IN | HEART RATE: 80 BPM | DIASTOLIC BLOOD PRESSURE: 72 MMHG | SYSTOLIC BLOOD PRESSURE: 124 MMHG

## 2023-01-30 DIAGNOSIS — Z13.1 SCREENING FOR DIABETES MELLITUS (DM): ICD-10-CM

## 2023-01-30 DIAGNOSIS — Z13.83 SCREENING FOR CARDIOVASCULAR, RESPIRATORY, AND GENITOURINARY DISEASES: ICD-10-CM

## 2023-01-30 DIAGNOSIS — F32.2 SEVERE DEPRESSION (HCC): ICD-10-CM

## 2023-01-30 DIAGNOSIS — Z13.6 SCREENING FOR CARDIOVASCULAR, RESPIRATORY, AND GENITOURINARY DISEASES: ICD-10-CM

## 2023-01-30 DIAGNOSIS — Z13.89 SCREENING FOR CARDIOVASCULAR, RESPIRATORY, AND GENITOURINARY DISEASES: ICD-10-CM

## 2023-01-30 DIAGNOSIS — F41.1 GAD (GENERALIZED ANXIETY DISORDER): ICD-10-CM

## 2023-01-30 DIAGNOSIS — I10 ESSENTIAL HYPERTENSION: Primary | ICD-10-CM

## 2023-01-30 RX ORDER — LOSARTAN POTASSIUM 25 MG/1
25 TABLET ORAL DAILY
Qty: 90 TABLET | Refills: 1 | Status: SHIPPED | OUTPATIENT
Start: 2023-01-30

## 2023-01-30 RX ORDER — SERTRALINE HYDROCHLORIDE 25 MG/1
25 TABLET, FILM COATED ORAL DAILY
Qty: 90 TABLET | Refills: 1 | Status: SHIPPED | OUTPATIENT
Start: 2023-01-30 | End: 2023-01-30 | Stop reason: SDUPTHER

## 2023-01-30 NOTE — PROGRESS NOTES
Assessment/Plan:    No problem-specific Assessment & Plan notes found for this encounter  CHA/depression better  He will try 50mg and if stable, f/u q6m  If no different or side effects, could go back to 25mg/d    htn stable  Continue losartan  Diet/exercise/low salt  Labs and f/u q6m    Hydroxyzine prn, sedation risk aware  If uses at hs instead of melatonin, avoid later dosing if has am grogginess     Diagnoses and all orders for this visit:    Essential hypertension  -     Comprehensive metabolic panel; Future  -     losartan (COZAAR) 25 mg tablet; Take 1 tablet (25 mg total) by mouth daily    CHA (generalized anxiety disorder)  -     Discontinue: sertraline (Zoloft) 25 mg tablet; Take 1 tablet (25 mg total) by mouth daily  -     sertraline (Zoloft) 50 mg tablet; Take 1 tablet (50 mg total) by mouth daily    Severe depression (HCC)  -     Discontinue: sertraline (Zoloft) 25 mg tablet; Take 1 tablet (25 mg total) by mouth daily  -     sertraline (Zoloft) 50 mg tablet; Take 1 tablet (50 mg total) by mouth daily    Screening for cardiovascular, respiratory, and genitourinary diseases  -     Lipid Panel with Direct LDL reflex; Future    Screening for diabetes mellitus (DM)  -     Comprehensive metabolic panel; Future        Return in about 6 months (around 7/30/2023) for Annual physical     Subjective:      Patient ID: Gamaliel Gant is a 44 y o  male      Chief Complaint   Patient presents with   • Follow-up     [facility]  New job, [employer] adviser, office based, 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 job though  CHA per pt  4 kids, 15m to 7yo    Better on zoloft  No suicidal thoughts  Eating better  Has regained some wt  Sleeping better    lexapro not tolerated in past    The following portions of the patient's history were reviewed and updated as appropriate: allergies, current medications, past family history, past medical history, past social history, past surgical history and problem list     Review of Systems Constitutional: Negative for chills and fever  Current Outpatient Medications   Medication Sig Dispense Refill   • ALPRAZolam (XANAX) 0 25 mg tablet 1-2 po qh6rs prn anxiety 60 tablet 0   • hydrOXYzine pamoate (VISTARIL) 25 mg capsule Take 1 capsule (25 mg total) by mouth 2 (two) times a day as needed for anxiety 60 capsule 1   • losartan (COZAAR) 25 mg tablet Take 1 tablet (25 mg total) by mouth daily 90 tablet 1   • sertraline (Zoloft) 50 mg tablet Take 1 tablet (50 mg total) by mouth daily 90 tablet 1     No current facility-administered medications for this visit  Objective:    /72   Pulse 80   Temp 97 7 °F (36 5 °C)   Resp 16   Ht 6' 1" (1 854 m)   Wt 97 8 kg (215 lb 9 6 oz)   SpO2 98%   BMI 28 44 kg/m²        Physical Exam  Vitals and nursing note reviewed  Constitutional:       General: He is not in acute distress  Appearance: He is well-developed  He is not ill-appearing  HENT:      Head: Normocephalic  Right Ear: Tympanic membrane normal       Left Ear: Tympanic membrane normal    Eyes:      General: No scleral icterus  Conjunctiva/sclera: Conjunctivae normal    Cardiovascular:      Rate and Rhythm: Normal rate and regular rhythm  Heart sounds: No murmur heard  Pulmonary:      Effort: Pulmonary effort is normal  No respiratory distress  Breath sounds: No wheezing  Abdominal:      Palpations: Abdomen is soft  Musculoskeletal:         General: No deformity  Cervical back: Neck supple  Skin:     General: Skin is warm and dry  Coloration: Skin is not pale  Neurological:      Mental Status: He is alert  Psychiatric:         Behavior: Behavior normal          Thought Content: Thought content normal        BMI Counseling: Body mass index is 28 44 kg/m²  The BMI is above normal  Nutrition recommendations include decreasing portion sizes and moderation in carbohydrate intake  Exercise recommendations include exercising 3-5 times per week  No pharmacotherapy was ordered  Rationale for BMI follow-up plan is due to patient being overweight or obese                Nomi Doherty DO

## 2023-02-05 LAB
ALBUMIN SERPL-MCNC: 4.4 G/DL (ref 4–5)
ALBUMIN/GLOB SERPL: 1.8 {RATIO} (ref 1.2–2.2)
ALP SERPL-CCNC: 103 IU/L (ref 44–121)
ALT SERPL-CCNC: 23 IU/L (ref 0–44)
AST SERPL-CCNC: 20 IU/L (ref 0–40)
BILIRUB SERPL-MCNC: 0.3 MG/DL (ref 0–1.2)
BUN SERPL-MCNC: 22 MG/DL (ref 6–20)
BUN/CREAT SERPL: 22 (ref 9–20)
CALCIUM SERPL-MCNC: 9.1 MG/DL (ref 8.7–10.2)
CHLORIDE SERPL-SCNC: 103 MMOL/L (ref 96–106)
CHOLEST SERPL-MCNC: 180 MG/DL (ref 100–199)
CO2 SERPL-SCNC: 24 MMOL/L (ref 20–29)
CREAT SERPL-MCNC: 0.98 MG/DL (ref 0.76–1.27)
EGFR: 101 ML/MIN/1.73
GLOBULIN SER-MCNC: 2.4 G/DL (ref 1.5–4.5)
GLUCOSE SERPL-MCNC: 101 MG/DL (ref 70–99)
HDLC SERPL-MCNC: 45 MG/DL
LDLC SERPL CALC-MCNC: 117 MG/DL (ref 0–99)
MICRODELETION SYND BLD/T FISH: NORMAL
POTASSIUM SERPL-SCNC: 4.3 MMOL/L (ref 3.5–5.2)
PROT SERPL-MCNC: 6.8 G/DL (ref 6–8.5)
SODIUM SERPL-SCNC: 140 MMOL/L (ref 134–144)
TRIGL SERPL-MCNC: 98 MG/DL (ref 0–149)

## 2023-04-28 DIAGNOSIS — F41.8 SITUATIONAL ANXIETY: ICD-10-CM

## 2023-04-28 RX ORDER — ALPRAZOLAM 0.25 MG/1
TABLET ORAL
Qty: 60 TABLET | Refills: 0 | Status: SHIPPED | OUTPATIENT
Start: 2023-04-28

## 2023-05-31 ENCOUNTER — OFFICE VISIT (OUTPATIENT)
Dept: URGENT CARE | Facility: CLINIC | Age: 38
End: 2023-05-31

## 2023-05-31 VITALS
RESPIRATION RATE: 16 BRPM | TEMPERATURE: 97.2 F | OXYGEN SATURATION: 98 % | BODY MASS INDEX: 28.63 KG/M2 | WEIGHT: 216 LBS | HEART RATE: 72 BPM | HEIGHT: 73 IN

## 2023-05-31 DIAGNOSIS — L73.9 FOLLICULITIS: Primary | ICD-10-CM

## 2023-05-31 RX ORDER — CEPHALEXIN 500 MG/1
500 CAPSULE ORAL EVERY 8 HOURS SCHEDULED
Qty: 15 CAPSULE | Refills: 0 | Status: SHIPPED | OUTPATIENT
Start: 2023-05-31 | End: 2023-06-05

## 2023-05-31 NOTE — PROGRESS NOTES
[facility]        NAME: Darrell Curran is a 43 y o  male  : 1985    MRN: [id number]  DATE: May 31, 2023  TIME: 7:16 PM    Assessment and Plan   Folliculitis [X50 9]  1  Folliculitis  cephalexin (KEFLEX) 500 mg capsule            Patient Instructions   Patient Instructions   Take Keflex as instructed for the next 5 days, also discussed applying topical antibiotic cream to the area  With any progression or worsening of symptoms return or be seen in ER  Follow-up with PCP  Follow up with PCP in 3-5 days  Proceed to  ER if symptoms worsen  Chief Complaint     Chief Complaint   Patient presents with   • Nasal Pain     Possible ingrown hair or pimple, nose is red and painful- started a couple days ago, OTC not taken          History of Present Illness       Patient is a 39-year-old male presenting today with nose pain x3 days  Patient notes a few days ago he noticed a small pimple-like lesion to the tip of his nose, did not think much of it but notes over the last few days he has had progressively worsening redness and swelling of the tip and inside of his nose particularly on the right side, states that he did attempt to drain the area with a needle last night and notes he got a significant amount of fluid which was mostly clear out from the tip of his nose, notes he had a similar occurrence to this many years ago and was diagnosed with a ingrown hair that got infected, is expressing concern of possible infection  Denies fever, chills, N/V/D  Review of Systems   Review of Systems   Constitutional: Negative for chills and fever  HENT: Negative for ear pain and sore throat  Eyes: Negative for pain and visual disturbance  Respiratory: Negative for cough and shortness of breath  Cardiovascular: Negative for chest pain and palpitations  Gastrointestinal: Negative for abdominal pain and vomiting  Genitourinary: Negative for dysuria and hematuria     Musculoskeletal: Negative "for arthralgias and back pain  Skin: Positive for rash  Neurological: Negative for seizures and syncope  All other systems reviewed and are negative  Current Medications       Current Outpatient Medications:   •  ALPRAZolam (XANAX) 0 25 mg tablet, 1-2 po qh6rs prn anxiety, Disp: 60 tablet, Rfl: 0  •  cephalexin (KEFLEX) 500 mg capsule, Take 1 capsule (500 mg total) by mouth every 8 (eight) hours for 5 days, Disp: 15 capsule, Rfl: 0  •  hydrOXYzine pamoate (VISTARIL) 25 mg capsule, Take 1 capsule (25 mg total) by mouth 2 (two) times a day as needed for anxiety, Disp: 60 capsule, Rfl: 1  •  losartan (COZAAR) 25 mg tablet, Take 1 tablet (25 mg total) by mouth daily, Disp: 90 tablet, Rfl: 1  •  sertraline (Zoloft) 50 mg tablet, Take 1 tablet (50 mg total) by mouth daily, Disp: 90 tablet, Rfl: 1    Current Allergies     Allergies as of 05/31/2023 - Reviewed 05/31/2023   Allergen Reaction Noted   • Penicillins Dizziness 11/22/2010            The following portions of the patient's history were reviewed and updated as appropriate: allergies, current medications, past family history, past medical history, past social history, past surgical history and problem list      Past Medical History:   Diagnosis Date   • Anxiety    • Depression        Past Surgical History:   Procedure Laterality Date   • WISDOM TOOTH EXTRACTION         Family History   Problem Relation Age of Onset   • Hypertension Family         HIGH BLOOD PRESSURE         Medications have been verified  Objective   Pulse 72   Temp (!) 97 2 °F (36 2 °C)   Resp 16   Ht 6' 1\" (1 854 m)   Wt 98 kg (216 lb)   SpO2 98%   BMI 28 50 kg/m²        Physical Exam     Physical Exam  Vitals and nursing note reviewed  HENT:      Head: Normocephalic        Right Ear: Tympanic membrane, ear canal and external ear normal       Left Ear: Tympanic membrane, ear canal and external ear normal       Nose:        Comments: Moderate redness and swelling to " distal aspect/tip of nose, area is exquisitely tender to palpation, small superficial abrasion to right nare, no active discharge or drainage     Mouth/Throat:      Mouth: Mucous membranes are moist       Pharynx: Oropharynx is clear  Cardiovascular:      Rate and Rhythm: Normal rate and regular rhythm  Pulses: Normal pulses  Heart sounds: Normal heart sounds  Pulmonary:      Effort: Pulmonary effort is normal       Breath sounds: Normal breath sounds  Skin:     General: Skin is warm  Capillary Refill: Capillary refill takes less than 2 seconds  Neurological:      Mental Status: He is alert

## 2023-05-31 NOTE — PATIENT INSTRUCTIONS
Take Keflex as instructed for the next 5 days, also discussed applying topical antibiotic cream to the area  With any progression or worsening of symptoms return or be seen in ER  Follow-up with PCP

## 2023-08-06 DIAGNOSIS — I10 ESSENTIAL HYPERTENSION: ICD-10-CM

## 2023-08-06 DIAGNOSIS — F41.1 GAD (GENERALIZED ANXIETY DISORDER): ICD-10-CM

## 2023-08-06 DIAGNOSIS — F32.2 SEVERE DEPRESSION (HCC): ICD-10-CM

## 2023-08-09 RX ORDER — LOSARTAN POTASSIUM 25 MG/1
25 TABLET ORAL DAILY
Qty: 30 TABLET | Refills: 0 | Status: SHIPPED | OUTPATIENT
Start: 2023-08-09

## 2023-08-21 ENCOUNTER — OFFICE VISIT (OUTPATIENT)
Dept: FAMILY MEDICINE CLINIC | Facility: CLINIC | Age: 38
End: 2023-08-21
Payer: COMMERCIAL

## 2023-08-21 VITALS
WEIGHT: 232 LBS | HEART RATE: 79 BPM | TEMPERATURE: 97.6 F | HEIGHT: 73 IN | SYSTOLIC BLOOD PRESSURE: 120 MMHG | OXYGEN SATURATION: 99 % | DIASTOLIC BLOOD PRESSURE: 80 MMHG | RESPIRATION RATE: 18 BRPM | BODY MASS INDEX: 30.75 KG/M2

## 2023-08-21 DIAGNOSIS — Z13.6 SCREENING FOR CARDIOVASCULAR, RESPIRATORY, AND GENITOURINARY DISEASES: ICD-10-CM

## 2023-08-21 DIAGNOSIS — Z30.09 ENCOUNTER FOR VASECTOMY ASSESSMENT: ICD-10-CM

## 2023-08-21 DIAGNOSIS — I10 ESSENTIAL HYPERTENSION: Primary | ICD-10-CM

## 2023-08-21 DIAGNOSIS — F32.2 SEVERE DEPRESSION (HCC): ICD-10-CM

## 2023-08-21 DIAGNOSIS — Z13.83 SCREENING FOR CARDIOVASCULAR, RESPIRATORY, AND GENITOURINARY DISEASES: ICD-10-CM

## 2023-08-21 DIAGNOSIS — Z12.5 PROSTATE CANCER SCREENING: ICD-10-CM

## 2023-08-21 DIAGNOSIS — Z13.89 SCREENING FOR CARDIOVASCULAR, RESPIRATORY, AND GENITOURINARY DISEASES: ICD-10-CM

## 2023-08-21 DIAGNOSIS — R73.03 PREDIABETES: ICD-10-CM

## 2023-08-21 DIAGNOSIS — F41.1 GAD (GENERALIZED ANXIETY DISORDER): ICD-10-CM

## 2023-08-21 DIAGNOSIS — S46.211A STRAIN OF RIGHT BICEPS TENDON: ICD-10-CM

## 2023-08-21 PROCEDURE — 99214 OFFICE O/P EST MOD 30 MIN: CPT | Performed by: FAMILY MEDICINE

## 2023-08-21 RX ORDER — LOSARTAN POTASSIUM 25 MG/1
25 TABLET ORAL DAILY
Qty: 90 TABLET | Refills: 1 | Status: SHIPPED | OUTPATIENT
Start: 2023-08-21

## 2023-08-21 RX ORDER — SERTRALINE HYDROCHLORIDE 100 MG/1
100 TABLET, FILM COATED ORAL DAILY
Qty: 90 TABLET | Refills: 1 | Status: SHIPPED | OUTPATIENT
Start: 2023-08-21

## 2023-08-21 NOTE — PROGRESS NOTES
Assessment/Plan:    No problem-specific Assessment & Plan notes found for this encounter. Depression/CHA with irritable mood  Offer psychology  Suggest psychiatry if any issues with bipolar or ADD in future  Try higher dose zoloft 100mg/d from 50mg/d but can return to 50mg if not effective    htn stable  Continue meds  Watch wt    Suggest PT for right shoulder but declines  Offer ortho for eval, possible injection    Prediabetes hx now with wt gain  Recheck labs  Diet advised     Diagnoses and all orders for this visit:    Essential hypertension  -     Comprehensive metabolic panel; Future  -     losartan (COZAAR) 25 mg tablet; Take 1 tablet (25 mg total) by mouth daily    Severe depression ([address])  -     Ambulatory referral to Psychology; Future  -     sertraline (ZOLOFT) 100 mg tablet; Take 1 tablet (100 mg total) by mouth daily    Strain of right biceps tendon  -     Ambulatory referral to Orthopedic Surgery; Future    Encounter for vasectomy assessment  -     Ambulatory referral to Urology; Future    CHA (generalized anxiety disorder)  -     sertraline (ZOLOFT) 100 mg tablet; Take 1 tablet (100 mg total) by mouth daily    Screening for cardiovascular, respiratory, and genitourinary diseases  -     Lipid Panel with Direct LDL reflex; Future    Prediabetes  -     Hemoglobin A1C; Future    Prostate cancer screening  -     PSA, ultrasensitive; Future              Return in about 6 months (around 2/21/2024) for Annual physical.    Subjective:      Patient ID: Adair Gorman is a 43 y.o. male.     Chief Complaint   Patient presents with   • Follow-up     Blood pressure rmklpn       HPI  Staying active  Coaching  Gained 20#  Mood affects diet  Some stress not-eating  Still gets depressed  Gets anxious/stressed at work  [employer] overwhelmed at times and also frustrated  Wondering about bipolar vs ADD    Right shoulder pain  Ghanshyam throwing motion  Months  No trauma    [address]th child on way  Interested in vasectomy    The following portions of the patient's history were reviewed and updated as appropriate: allergies, current medications, past family history, past medical history, past social history, past surgical history and problem list.    Review of Systems   Constitutional: Positive for unexpected weight change. Musculoskeletal: Positive for arthralgias. Current Outpatient Medications   Medication Sig Dispense Refill   • ALPRAZolam (XANAX) 0.25 mg tablet 1-2 po qh6rs prn anxiety 60 tablet 0   • hydrOXYzine pamoate (VISTARIL) 25 mg capsule Take 1 capsule (25 mg total) by mouth 2 (two) times a day as needed for anxiety 60 capsule 1   • losartan (COZAAR) 25 mg tablet Take 1 tablet (25 mg total) by mouth daily 90 tablet 1   • sertraline (ZOLOFT) 100 mg tablet Take 1 tablet (100 mg total) by mouth daily 90 tablet 1     No current facility-administered medications for this visit. Objective:    /80   Pulse 79   Temp 97.6 °F (36.4 °C)   Resp 18   Ht 6' 1" (1.854 m)   Wt 105 kg (232 lb)   SpO2 99%   BMI 30.61 kg/m²        Physical Exam  Vitals and nursing note reviewed. Constitutional:       General: He is not in acute distress. Appearance: He is well-developed. He is obese. He is not ill-appearing. HENT:      Head: Normocephalic. Right Ear: Tympanic membrane normal.      Left Ear: Tympanic membrane normal.   Eyes:      General: No scleral icterus. Conjunctiva/sclera: Conjunctivae normal.   Cardiovascular:      Rate and Rhythm: Normal rate and regular rhythm. Pulmonary:      Effort: Pulmonary effort is normal. No respiratory distress. Breath sounds: No wheezing. Abdominal:      Palpations: Abdomen is soft. Musculoskeletal:         General: Tenderness present. No deformity. Cervical back: Neck supple. Comments: Right long head biceps tenderness  [place] neg   Skin:     General: Skin is warm and dry. Coloration: Skin is not pale. Neurological:      Mental Status: He is alert. Motor: No weakness. Gait: Gait normal.   Psychiatric:         Mood and Affect: Affect is not angry or inappropriate. Behavior: Behavior normal.         Thought Content:  Thought content normal.                Sonal Mas, DO

## 2023-09-03 LAB
ALBUMIN SERPL-MCNC: 4.7 G/DL (ref 4.1–5.1)
ALBUMIN/GLOB SERPL: 2 {RATIO} (ref 1.2–2.2)
ALP SERPL-CCNC: 100 IU/L (ref 44–121)
ALT SERPL-CCNC: 22 IU/L (ref 0–44)
AST SERPL-CCNC: 17 IU/L (ref 0–40)
BILIRUB SERPL-MCNC: 0.4 MG/DL (ref 0–1.2)
BUN SERPL-MCNC: 20 MG/DL (ref 6–20)
BUN/CREAT SERPL: 19 (ref 9–20)
CALCIUM SERPL-MCNC: 9.6 MG/DL (ref 8.7–10.2)
CHLORIDE SERPL-SCNC: 103 MMOL/L (ref 96–106)
CO2 SERPL-SCNC: 23 MMOL/L (ref 20–29)
CREAT SERPL-MCNC: 1.04 MG/DL (ref 0.76–1.27)
EGFR: 94 ML/MIN/1.73
GLOBULIN SER-MCNC: 2.4 G/DL (ref 1.5–4.5)
GLUCOSE SERPL-MCNC: 107 MG/DL (ref 70–99)
POTASSIUM SERPL-SCNC: 4.6 MMOL/L (ref 3.5–5.2)
PROT SERPL-MCNC: 7.1 G/DL (ref 6–8.5)
SODIUM SERPL-SCNC: 140 MMOL/L (ref 134–144)

## 2023-11-22 DIAGNOSIS — I10 ESSENTIAL HYPERTENSION: ICD-10-CM

## 2023-11-22 RX ORDER — LOSARTAN POTASSIUM 25 MG/1
25 TABLET ORAL DAILY
Qty: 90 TABLET | Refills: 1 | Status: SHIPPED | OUTPATIENT
Start: 2023-11-22

## 2023-11-28 ENCOUNTER — OFFICE VISIT (OUTPATIENT)
Dept: UROLOGY | Facility: CLINIC | Age: 38
End: 2023-11-28
Payer: COMMERCIAL

## 2023-11-28 VITALS
DIASTOLIC BLOOD PRESSURE: 92 MMHG | RESPIRATION RATE: 18 BRPM | BODY MASS INDEX: 31.94 KG/M2 | WEIGHT: 241 LBS | SYSTOLIC BLOOD PRESSURE: 150 MMHG | HEIGHT: 73 IN

## 2023-11-28 DIAGNOSIS — Z30.09 VASECTOMY EVALUATION: Primary | ICD-10-CM

## 2023-11-28 PROCEDURE — 99203 OFFICE O/P NEW LOW 30 MIN: CPT | Performed by: UROLOGY

## 2023-11-28 RX ORDER — ALPRAZOLAM 0.25 MG/1
0.25 TABLET ORAL
COMMUNITY

## 2023-11-28 RX ORDER — LOSARTAN POTASSIUM 25 MG/1
25 TABLET ORAL DAILY
COMMUNITY
Start: 2022-12-27

## 2023-11-28 RX ORDER — SERTRALINE HYDROCHLORIDE 100 MG/1
100 TABLET, FILM COATED ORAL DAILY
COMMUNITY
Start: 2022-12-27

## 2023-11-28 RX ORDER — DIAZEPAM 10 MG/1
10 TABLET ORAL ONCE
Qty: 1 TABLET | Refills: 0 | Status: SHIPPED | OUTPATIENT
Start: 2023-11-28 | End: 2023-11-28

## 2023-11-28 NOTE — PROGRESS NOTES
Assessment/Plan:    Vasectomy evaluation  We discussed how vasectomy is performed including the risks and benefits. Discussed role for Valium which he is interested in. We discussed whether to hold off on vasectomy until after your child is born given the risks in the infant. But they are confident they do not want more children. We discussed his plan of care at home. He should try to have minimal activity for 3 days to reduce the risk for bleeding/hematoma. After 3 days he can move as desired but no heavy lifting or running for 2 weeks. No ejaculations for 1-2 weeks. No submerging his incision below water for 2 weeks. He should use tylenol and NSAIDs (although I asked him minimize NSAID use at first to minimize bleeding risk). His skin sutures will dissolve on their own. Some skin separation is not uncommon and not concerning. He should contact us for signs of infection, bleeding or persistent pain. He should ejaculate approximately 30 times before performing a semen analysis. If this does not show sperm or shows rare nonmotile sperm then he is considered infertile but until a semen analysis is performed with these results he should be using protection with intercourse. Subjective:      Patient ID: Ramos Dowd is a 43 y.o. male. HPI    42 yo male here to discuss vasectomy. They have 4 children and are expecting #5. The patient present here with his wife. They are confident they do not  want more children. No significant past urologic or medical history.     Past Surgical History:   Procedure Laterality Date    WISDOM TOOTH EXTRACTION      2018        Past Medical History:   Diagnosis Date    Anxiety     Hypertension         AUA SYMPTOM SCORE      Flowsheet Row Most Recent Value   AUA SYMPTOM SCORE    How often have you had a sensation of not emptying your bladder completely after you finished urinating? 0 (P)     How often have you had to urinate again less than two hours after you finished urinating? 1 (P)     How often have you found you stopped and started again several times when you urinate? 0 (P)     How often have you found it difficult to postpone urination? 0 (P)     How often have you had a weak urinary stream? 1 (P)     How often have you had to push or strain to begin urination? 1 (P)     How many times did you most typically get up to urinate from the time you went to bed at night until the time you got up in the morning? 1 (P)     Quality of Life: If you were to spend the rest of your life with your urinary condition just the way it is now, how would you feel about that? 0 (P)     AUA SYMPTOM SCORE 4 (P)              Review of Systems   Constitutional:  Negative for chills and fever. HENT:  Negative for ear pain and sore throat. Eyes:  Negative for pain and visual disturbance. Respiratory:  Negative for cough and shortness of breath. Cardiovascular:  Negative for chest pain and palpitations. Gastrointestinal:  Negative for abdominal pain and vomiting. Genitourinary:  Negative for dysuria and hematuria. Musculoskeletal:  Negative for arthralgias and back pain. Skin:  Negative for color change and rash. Neurological:  Negative for seizures and syncope. All other systems reviewed and are negative. Objective:      /92   Resp 18   Ht 6' 1" (1.854 m)   Wt 109 kg (241 lb)   BMI 31.80 kg/m²     No results found for: "PSA"       Physical Exam  Vitals reviewed. Constitutional:       Appearance: Normal appearance. He is normal weight. HENT:      Head: Normocephalic and atraumatic. Eyes:      Pupils: Pupils are equal, round, and reactive to light. Abdominal:      General: Abdomen is flat. Genitourinary:     Penis: Normal.       Testes: Normal.      Comments: Vasa easily palpable bilaterally  Neurological:      General: No focal deficit present. Mental Status: He is alert and oriented to person, place, and time.    Psychiatric: Mood and Affect: Mood normal.         Thought Content: Thought content normal.           Orders  No orders of the defined types were placed in this encounter.

## 2023-11-28 NOTE — ASSESSMENT & PLAN NOTE
We discussed how vasectomy is performed including the risks and benefits. Discussed role for Valium which he is interested in. We discussed whether to hold off on vasectomy until after your child is born given the risks in the infant. But they are confident they do not want more children. We discussed his plan of care at home. He should try to have minimal activity for 3 days to reduce the risk for bleeding/hematoma. After 3 days he can move as desired but no heavy lifting or running for 2 weeks. No ejaculations for 1-2 weeks. No submerging his incision below water for 2 weeks. He should use tylenol and NSAIDs (although I asked him minimize NSAID use at first to minimize bleeding risk). His skin sutures will dissolve on their own. Some skin separation is not uncommon and not concerning. He should contact us for signs of infection, bleeding or persistent pain. He should ejaculate approximately 30 times before performing a semen analysis. If this does not show sperm or shows rare nonmotile sperm then he is considered infertile but until a semen analysis is performed with these results he should be using protection with intercourse.

## 2024-01-29 ENCOUNTER — PROCEDURE VISIT (OUTPATIENT)
Dept: UROLOGY | Facility: CLINIC | Age: 39
End: 2024-01-29
Payer: COMMERCIAL

## 2024-01-29 DIAGNOSIS — Z30.2 ENCOUNTER FOR VASECTOMY: Primary | ICD-10-CM

## 2024-01-29 PROCEDURE — 55250 REMOVAL OF SPERM DUCT(S): CPT | Performed by: UROLOGY

## 2024-01-29 PROCEDURE — 88302 TISSUE EXAM BY PATHOLOGIST: CPT | Performed by: PATHOLOGY

## 2024-01-29 NOTE — PATIENT INSTRUCTIONS
"Vasectomy  A vasectomy is a surgical procedure performed on adult males in which the vasa deferentia (tubes that carry sperm from the testicles to the seminal vesicle) are interrupted through small puncture incisions in the scrotum.  The semen no longer contains sperm after the tubes are cut, so conception cannot occur.  The testicles continue to produce sperm, but they die and are absorbed by the body.  Erectile function, orgasm, and ejaculation occur normally after the procedure.    PURPOSE:  The purpose of the vasectomy is to provide reliable contraception.  Research indicates that the level of effectiveness is 99.6%.  Vasectomy is the most reliable method of contraception and has fewer complications and a faster recovery time than female sterilization methods.  Approximately 500,000 vasectomies are performed annually in the [place].  About one out of every six men over the age of 35 has had a vasectomy.     DESCRIPTION:  Vasectomies are performed in the doctor's office using local anesthesia.  The area around the patient's scrotum (the sac containing the testicles that produce sperm) will be shaved and cleaned with an antiseptic solution to reduce the chance of infection.  A small incision is made into the scrotum.  Each of the vasa deferentia (one from each testicle) is tied in two places with non-absorbable (permanent) sutures and the tube is severed between the ties.  The ends may be cauterized to decrease the chance that they will leak or grow back together.  \"Non-scalpel\" vasectomies are gaining in popularity.  Instead of an incision, a small puncture is made in the scrotum.  The vasa deferentia are cut and sealed in a manner similar to that described above.  Advantages include less damage to the tissues, less bleeding, less risk of infection, and less discomfort after the procedure.      DIAGNOSIS/PREPARTION:   No special physical preparation is required for a vasectomy.  The physician will first " assess the patient's general health in order to identify any potential problems that could occur.  The physician will then explain the possible risks and side effects of the procedure.  The patient is asked to sign a consent form which indicates that he understands the information he has received, and gives the doctor permission to perform the operation.  The scrotum should be shaved by the patient prior to the procedure: shave entire scrotum to base of penis. You will need a ;  is to remain in the office during the procedure.    CARE AFTER YOUR PROCEDURE:  Following the surgery, ice packs are often applied to the scrotum to decrease pain and swelling.  A dressing (or athletic supporter) which supports the scrotum can also reduce pain.  Mild over-the-counter pain medications such as aspirin or acetaminophen (Tylenol) should be able to control any discomfort.  Activities may be restricted for one to two days, and sexual intercourse for three to four days.    RISKS:  There are very few risks associated with vasectomy other than infection, bruising, epididymitis (inflammation of the tube that carries the sperm from the testicle to the penis), sperm granulomas (collections of fluid that leaks from a poorly sealed or ties vas deferens), and chronic pain in 1-2% of men.  Patients do not experience difficulty achieving an erection, maintaining an erection, or ejaculating.  There is no decrease in the production of the male hormone (testosterone), and the patient's sex drive and ability are not altered.  Vasectomy is safer and less expensive than a tubal ligation (sterilization of a female by cutting the Fallopian tubes to prevent conception).    According to both the World Health Organization (WHO) and National Institutes of Health (NIH), there is no evidence that a vasectomy will increase a man's long-term risk of testicular cancer, prostate cancer or heart disease.    NORMAL RESULTS:   Vasectomies are 99%  successful in preventing conception.  As such, a male sterilization is one of the most effective methods of contraception available to consumers.  Complications occur in approximately 5% of vasectomies.  The rates of incidences of some of the more common complications are:  mild bleeding into the scrotum:  1:50  major bleeding into the scrotum:  1:100  infection:  1:25  epididymitis:  1:100  sperm granuloma:  1:20  persistent pain:  1-2% of men  POST VASECTOMY EXPECTATIONS    You are not sterile immediately following the procedure. You will need to have a semen analysis test after the appropriate interval. You will need to use contraceptive protection until you have been cleared by the doctor.     You will need to continually ice the area for the first 24 hours following the procedure, even at bedtime. The second day you will need to ice every 3 hours for approximately 15-30 minutes.     You will need to wear tight briefs for support. If you have a job requiring you to stand for prolonged periods of time, we do recommend wearing a jock strap on the outside of your underwear.     If you need to use pain medication, do not mix with alcoholic beverages. You may use Advil or Tylenol for discomfort in lieu of the prescribed pain medication.     You may shower in 24 hours.    You may resume sexual activity in 72 hours.    We recommend that you do not take a bath, swim or soak in a hot tub for at least 7-10 days - preferably not before your post-op visit.     You may return to work in 48-72 hours unless otherwise advised. If necessary, a work note can be given.     You should not lift anything over 5-10lbs for the next 72 hours.     You should monitor your temperature once a day for a week. Call if you have any fever/chills, or if there is irregular drainage noted from the incision site.       POST VASECTOMY SPECIMEN COLLECTION  PURPOSE:   Patients are not immediately sterile following vasectomy. There is significant  variation in the time necessary to clear previously produced sperm, therefore follow-up testing is necessary to assure sterility.  INTRUCTIONS:  Confirmation of sterility requires a semen analysis.   The sample can be submitted to the lab 8 weeks post-procedure.    COLLECTION TIPS:  You should have at least 15 ejaculations prior to submitting the sample.  The specimen should be delivered to the lab within 1 hour of collection.  You may use masturbation to collect specimen.  Do not have any ejaculations 2 days before providing sample.  Keep the specimen close to body temperature.    Please call the office for results 1 week after specimen submission for clearance or further instructions. Be sure to use an alternative form of birth control until instructed by office.     All testing through [facility] lab must be scheduled, in advance, [phone]

## 2024-01-29 NOTE — PROGRESS NOTES
"    Vasectomy     Date/Time  1/29/2024 3:00 PM     Performed by  Azar Hanson DO   Authorized by  Azar Hanson DO     Universal Protocol   Consent: Verbal consent obtained. Written consent obtained.  Risks and benefits: risks, benefits and alternatives were discussed  Consent given by: patient  Time out: Immediately prior to procedure a \"time out\" was called to verify the correct patient, procedure, equipment, support staff and site/side marked as required.  Timeout called at: 1/29/2024 3:40 PM.  Patient understanding: patient states understanding of the procedure being performed  Patient consent: the patient's understanding of the procedure matches consent given  Procedure consent: procedure consent matches procedure scheduled  Relevant documents: relevant documents present and verified  Required items: required blood products, implants, devices, and special equipment available  Patient identity confirmed: verbally with patient      Local anesthesia used: yes      Anesthesia: local infiltration     Anesthesia   Local anesthesia used: yes  Local Anesthetic: lidocaine 2% without epinephrine  Anesthetic total: 10 mL     Sedation   Patient sedated: no        Specimen: yes (BL vas)    Culture: no   Procedure Details   Procedure Notes: Procedures      No Scalpel Vasectomy Procedure Note    Indications: 38 y.o.  y.o. male desiring permanent sterilization    Pre-operative Diagnosis: Undesired fertility    Post-operative Diagnosis: Undesired fertility    Anesthesia: Lidocaine 2% without epinephrine      Procedure Details       Risks and benefits of vasectomy were previously discussed. Informed consent was obtained at his prior office visit. The patient had taken a benzodiazepine as prescribed for anxiolysis and he had showered the morning of the procedure and clipped excess pubic hair.  Music of the patient's choosing was also played for additional anxiolysis.    The patient was placed in the supine " position. His genitalia were prepped and draped in the usual sterile fashion.     The left vas deferens was grasped and presented to the area of interest on the left hemiscrotum. Next, 2% lidocaine was used to anesthetize the skin, subcutaneous tissue, and left vas deferens. The left vas deferens was grasped and presented into the surgical field with a vas clamp.   A #15 blade was used to make a longitudinal incision in the sheath of the vas deferens. The vas itself was then dissected free from the surrounding tissue. Clamps were placed proximally and distally and a 1 cm segment of the vas deferens was excised. Silk ties were applied and the exposed ends were fulgurated as was the lumen of the vas. Hemostasis was excellent. The vas was returned to its natural anatomic position after ensuring meticulous hemostasis.  A single stitch of 3-0 chromic was placed through the skin and dartos to reapproximate the defect in a horizontal mattress fashion.    The right vas deferens was then grasped and presented to the area of interest on the right hemiscrotum. Next, 2% lidocaine was used to anesthetize the skin, subcutaneous tissue, and right vas deferens. The right vas deferens was grasped and presented into the surgical field with a vas clamp.   A #15 blade was used to make a longitudinal incision in the sheath of the vas deferens. The vas itself was then dissected free from the surrounding tissue. Clamps were placed proximally and distally and a 1 cm segment of the vas deferens was excised. Silk ties were applied and the exposed ends were fulgurated as was the lumen of the vas. Hemostasis was excellent. The vas was returned to its natural anatomic position after ensuring meticulous hemostasis.  A single stitch of 3-0 chromic was placed through the skin and dartos to reapproximate the defect in a horizontal mattress fashion.        Specimen:   1.  Right vas deferens  2.  Left vas deferens    Condition:  Stable    Complications: none    Plan:      He did very well with the procedure today. Postprocedural instructions were provided. He will follow-up PRN. He will continue to use any form of birth control that he is currently using until his sterility is confirmed              Patient Transportation: confirmed  Patient tolerance: patient tolerated the procedure well with no immediate complications

## 2024-01-31 PROCEDURE — 88302 TISSUE EXAM BY PATHOLOGIST: CPT | Performed by: PATHOLOGY

## 2024-02-18 DIAGNOSIS — F32.2 SEVERE DEPRESSION (HCC): ICD-10-CM

## 2024-02-18 DIAGNOSIS — F41.1 GAD (GENERALIZED ANXIETY DISORDER): ICD-10-CM

## 2024-02-19 RX ORDER — SERTRALINE HYDROCHLORIDE 100 MG/1
100 TABLET, FILM COATED ORAL DAILY
Qty: 90 TABLET | Refills: 0 | Status: SHIPPED | OUTPATIENT
Start: 2024-02-19

## 2024-02-21 PROBLEM — Z13.1 SCREENING FOR DIABETES MELLITUS (DM): Status: RESOLVED | Noted: 2019-10-21 | Resolved: 2024-02-21

## 2024-02-21 PROBLEM — Z13.89 SCREENING FOR CARDIOVASCULAR, RESPIRATORY, AND GENITOURINARY DISEASES: Status: RESOLVED | Noted: 2019-10-21 | Resolved: 2024-02-21

## 2024-02-21 PROBLEM — Z13.83 SCREENING FOR CARDIOVASCULAR, RESPIRATORY, AND GENITOURINARY DISEASES: Status: RESOLVED | Noted: 2019-10-21 | Resolved: 2024-02-21

## 2024-02-21 PROBLEM — Z13.6 SCREENING FOR CARDIOVASCULAR, RESPIRATORY, AND GENITOURINARY DISEASES: Status: RESOLVED | Noted: 2019-10-21 | Resolved: 2024-02-21

## 2024-03-11 ENCOUNTER — TELEPHONE (OUTPATIENT)
Age: 39
End: 2024-03-11

## 2024-03-11 NOTE — TELEPHONE ENCOUNTER
Wife wanted to know if an appt was needed for post vasc semen collection.    Number given to schedule.

## 2024-03-13 ENCOUNTER — APPOINTMENT (OUTPATIENT)
Dept: LAB | Facility: CLINIC | Age: 39
End: 2024-03-13
Payer: COMMERCIAL

## 2024-03-13 DIAGNOSIS — Z30.2 ENCOUNTER FOR VASECTOMY: ICD-10-CM

## 2024-03-13 LAB
DEPRECATED CD4 CELLS/CD8 CELLS BLD: 302 ML
SPERM MOTILE SMN QL MICRO: ABNORMAL

## 2024-03-13 PROCEDURE — 89321 SEMEN ANAL SPERM DETECTION: CPT

## 2024-03-21 ENCOUNTER — TELEPHONE (OUTPATIENT)
Age: 39
End: 2024-03-21

## 2024-03-21 NOTE — TELEPHONE ENCOUNTER
Pt's wife called requested to speak with someone regarding semen analysis results.     Please review

## 2024-03-22 ENCOUNTER — NURSE TRIAGE (OUTPATIENT)
Age: 39
End: 2024-03-22

## 2024-03-22 NOTE — TELEPHONE ENCOUNTER
Patient's wife called in regarding semen analysis results. Has a question as SPERM POST Vasectomy says; Rare, non-motile. Patient had vasectomy in November.     Be aware that patient has duplicate charts; Other MRN is [id number]. Urology notes and visits are in the other chart and lab results are in this one.

## 2024-03-22 NOTE — TELEPHONE ENCOUNTER
Spoke to patient's wife and was advised patient has a duplicate chart. Encounter was noted from AP of semen analysis results. She is understanding.

## 2024-03-22 NOTE — TELEPHONE ENCOUNTER
Spoke to patient's wife and advised of semen analysis results per AP's note. She is understanding.

## 2024-04-10 ENCOUNTER — NURSE TRIAGE (OUTPATIENT)
Age: 39
End: 2024-04-10

## 2024-04-10 NOTE — TELEPHONE ENCOUNTER
Please be aware patient has 2 charts. They are marked for merge. The other MRN is [id number], some notes are in that chart as well.     Patient's wife called in. Patient has been having issues since vasectomy 1/29/24. Swelling and pain in left testicle. Did improve slightly about 6 weeks after vasectomy, but now is worsening again. Patient frustrated. Patient does not want to see Dr. Anderson anymore. Scheduled w/ Julian on 4/17.          Reason for Disposition   The patient has moderate to severe pain   The patient has a post-op issue    Answer Questions - Initial Assessment  When did this start: Within couple weeks    Which side: Left    Did you suffer an injury to the scrotal/groin area: no    Have you noticed any mild, moderate or severe scrotal or testicular swelling: yes mild    Is there any redness or bruising: no    Do you have a history of testicular pain, swelling or other testicular issues: yes, achey    Is pain constant or intermittent: Constant    Can you qualify the pain: aching, dull, sharp, something else: aching    Do you have any aggravating factors: activity     Do you have any alleviating factors: being still    Do you have any other areas of pain, particularly flank or back: no    Do you have any urinary symptoms; Dysuria: no    Do you have a temperature of 101 or higher: no    Have you had any recent urological procedure or surgery: vasectomy in January    Were you referred by PCP, ED, urgent care or another specialty: Specialist     Have you had a scrotal or groin ultrasound: no    Were you prescribed any medications for this:no    Protocols used: Scrotal / Testicular Pain

## 2024-04-12 NOTE — TELEPHONE ENCOUNTER
Appt time changed to [facility] on the same day. Patient have vasectomy with a different doctor. Should not be with Dr. Jordan. Can be evaluated by [facility]. Please call and confirm new time

## 2024-04-17 ENCOUNTER — OFFICE VISIT (OUTPATIENT)
Dept: UROLOGY | Facility: CLINIC | Age: 39
End: 2024-04-17

## 2024-04-17 VITALS
SYSTOLIC BLOOD PRESSURE: 110 MMHG | WEIGHT: 248 LBS | OXYGEN SATURATION: 97 % | HEIGHT: 73 IN | BODY MASS INDEX: 32.87 KG/M2 | HEART RATE: 86 BPM | DIASTOLIC BLOOD PRESSURE: 70 MMHG

## 2024-04-17 DIAGNOSIS — N50.812 PAIN IN LEFT TESTICLE: Primary | ICD-10-CM

## 2024-04-17 PROCEDURE — 99024 POSTOP FOLLOW-UP VISIT: CPT | Performed by: PHYSICIAN ASSISTANT

## 2024-04-17 NOTE — PROGRESS NOTES
UROLOGY PROGRESS NOTE   Patient Identifiers: Gavino Momin (MRN [id number])  Date of Service: 4/17/2024    Subjective:   39-year-old male who had a vasectomy here in January called in complaining of left scrotal swelling with some discomfort.  Since he reported the symptoms they have montane is resolved.  He has no residual pain or swelling.    Reason for visit: Left orchialgia    Objective:     VITALS:    Vitals:    04/17/24 0954   BP: 110/70   Pulse: 86   SpO2: 97%     AUA SYMPTOM SCORE      Flowsheet Row Most Recent Value   AUA SYMPTOM SCORE    How often have you had a sensation of not emptying your bladder completely after you finished urinating? 0 (P)    How often have you had to urinate again less than two hours after you finished urinating? 0 (P)    How often have you found you stopped and started again several times when you urinate? 0 (P)    How often have you found it difficult to postpone urination? 0 (P)    How often have you had a weak urinary stream? 0 (P)    How often have you had to push or strain to begin urination? 0 (P)    How many times did you most typically get up to urinate from the time you went to bed at night until the time you got up in the morning? 1 (P)    Quality of Life: If you were to spend the rest of your life with your urinary condition just the way it is now, how would you feel about that? 0 (P)    AUA SYMPTOM SCORE 1 (P)               LABS:  Lab Results   Component Value Date    HGB 14.7 06/12/2021    HCT 42.7 06/12/2021    WBC 5.5 06/12/2021     06/12/2021   ]    Lab Results   Component Value Date    K 4.6 09/02/2023     09/02/2023    CO2 23 09/02/2023    BUN 20 09/02/2023    CREATININE 1.04 09/02/2023    CALCIUM 9.5 06/12/2021   ]        INPATIENT MEDS:    Current Outpatient Medications:     ALPRAZolam (XANAX) 0.25 mg tablet, 1-2 po qh6rs prn anxiety, Disp: 60 tablet, Rfl: 0    ALPRAZolam (XANAX) 0.25 mg tablet, Take 0.25 mg by mouth daily at bedtime as needed  "for anxiety, Disp: , Rfl:     hydrOXYzine pamoate (VISTARIL) 25 mg capsule, Take 1 capsule (25 mg total) by mouth 2 (two) times a day as needed for anxiety, Disp: 60 capsule, Rfl: 1    losartan (COZAAR) 25 mg tablet, TAKE 1 TABLET (25 MG TOTAL) BY MOUTH DAILY., Disp: 90 tablet, Rfl: 1    losartan (COZAAR) 25 mg tablet, Take 25 mg by mouth daily, Disp: , Rfl:     sertraline (ZOLOFT) 100 mg tablet, TAKE 1 TABLET BY MOUTH EVERY DAY, Disp: 90 tablet, Rfl: 0    sertraline (ZOLOFT) 100 mg tablet, Take 100 mg by mouth daily, Disp: , Rfl:     diazepam (VALIUM) 10 mg tablet, Take 1 tablet (10 mg total) by mouth once for 1 dose Take tablet after getting to office and signing consent for the procedure, Disp: 1 tablet, Rfl: 0      Physical Exam:   /70 (BP Location: Left arm, Patient Position: Sitting, Cuff Size: Standard)   Pulse 86   Ht 6' 1\" (1.854 m)   Wt 112 kg (248 lb)   SpO2 97%   BMI 32.72 kg/m²   GEN: no acute distress    RESP: breathing comfortably with no accessory muscle use    ABD: soft, non-tender, non-distended   INCISION:    EXT: no significant peripheral edema   (Male): Penis circumcised, phallus normal, meatus patent.  Testicles descended into scrotum bilaterally without masses nor tenderness.  No inguinal hernias bilaterally.      RADIOLOGY:   None    Assessment:   #1.  Left-sided orchialgia    Plan:   -His symptoms have completely resolved  -There are no abnormal findings on his exam  -Should he have a recurrence of the symptoms I recommend he call and I will order a scrotal ultrasound  -We reviewed his semen analysis which showed rare and mobile sperm making him sterile  -Otherwise follow-up as needed        "

## 2024-06-12 DIAGNOSIS — F41.1 GAD (GENERALIZED ANXIETY DISORDER): ICD-10-CM

## 2024-06-12 DIAGNOSIS — F32.2 SEVERE DEPRESSION (HCC): ICD-10-CM

## 2024-06-12 RX ORDER — SERTRALINE HYDROCHLORIDE 100 MG/1
100 TABLET, FILM COATED ORAL DAILY
Qty: 90 TABLET | Refills: 1 | Status: SHIPPED | OUTPATIENT
Start: 2024-06-12

## 2024-06-13 DIAGNOSIS — Z00.6 ENCOUNTER FOR EXAMINATION FOR NORMAL COMPARISON OR CONTROL IN CLINICAL RESEARCH PROGRAM: ICD-10-CM

## 2024-06-15 ENCOUNTER — APPOINTMENT (OUTPATIENT)
Dept: LAB | Facility: HOSPITAL | Age: 39
End: 2024-06-15

## 2024-06-15 DIAGNOSIS — Z00.6 ENCOUNTER FOR EXAMINATION FOR NORMAL COMPARISON OR CONTROL IN CLINICAL RESEARCH PROGRAM: ICD-10-CM

## 2024-06-15 PROCEDURE — 36415 COLL VENOUS BLD VENIPUNCTURE: CPT

## 2024-09-08 DIAGNOSIS — I10 ESSENTIAL HYPERTENSION: ICD-10-CM

## 2024-09-10 LAB
APOB+LDLR+PCSK9 GENE MUT ANL BLD/T: NOT DETECTED
BRCA1+BRCA2 DEL+DUP + FULL MUT ANL BLD/T: NOT DETECTED
MLH1+MSH2+MSH6+PMS2 GN DEL+DUP+FUL M: NOT DETECTED

## 2024-09-18 NOTE — TELEPHONE ENCOUNTER
Patient called Rx Refill Line inquiring status of Losartan. Please contact patient at [phone]  if refill is not appropriate and how to proceed.

## 2024-09-19 RX ORDER — LOSARTAN POTASSIUM 25 MG/1
25 TABLET ORAL DAILY
Qty: 30 TABLET | Refills: 0 | Status: SHIPPED | OUTPATIENT
Start: 2024-09-19

## 2024-10-02 ENCOUNTER — OFFICE VISIT (OUTPATIENT)
Dept: FAMILY MEDICINE CLINIC | Facility: CLINIC | Age: 39
End: 2024-10-02
Payer: COMMERCIAL

## 2024-10-02 VITALS
SYSTOLIC BLOOD PRESSURE: 124 MMHG | RESPIRATION RATE: 20 BRPM | HEIGHT: 72 IN | WEIGHT: 247 LBS | TEMPERATURE: 96 F | HEART RATE: 68 BPM | DIASTOLIC BLOOD PRESSURE: 84 MMHG | BODY MASS INDEX: 33.46 KG/M2

## 2024-10-02 DIAGNOSIS — Z13.220 SCREENING FOR LIPID DISORDERS: ICD-10-CM

## 2024-10-02 DIAGNOSIS — Z12.5 PROSTATE CANCER SCREENING: ICD-10-CM

## 2024-10-02 DIAGNOSIS — Z23 IMMUNIZATION DUE: ICD-10-CM

## 2024-10-02 DIAGNOSIS — R73.03 PREDIABETES: ICD-10-CM

## 2024-10-02 DIAGNOSIS — I10 ESSENTIAL HYPERTENSION: ICD-10-CM

## 2024-10-02 DIAGNOSIS — F32.2 SEVERE DEPRESSION (HCC): ICD-10-CM

## 2024-10-02 DIAGNOSIS — F41.1 GAD (GENERALIZED ANXIETY DISORDER): ICD-10-CM

## 2024-10-02 DIAGNOSIS — Z00.00 HEALTHCARE MAINTENANCE: Primary | ICD-10-CM

## 2024-10-02 DIAGNOSIS — Z13.1 SCREENING FOR DIABETES MELLITUS (DM): ICD-10-CM

## 2024-10-02 PROBLEM — J31.0 CHRONIC RHINITIS: Status: ACTIVE | Noted: 2020-04-22

## 2024-10-02 PROBLEM — M25.512 PAIN OF BOTH SHOULDER JOINTS: Status: RESOLVED | Noted: 2021-06-08 | Resolved: 2024-10-02

## 2024-10-02 PROBLEM — R53.83 OTHER FATIGUE: Status: RESOLVED | Noted: 2021-06-08 | Resolved: 2024-10-02

## 2024-10-02 PROBLEM — Z98.52 S/P VASECTOMY: Status: ACTIVE | Noted: 2023-11-28

## 2024-10-02 PROBLEM — M25.511 PAIN OF BOTH SHOULDER JOINTS: Status: RESOLVED | Noted: 2021-06-08 | Resolved: 2024-10-02

## 2024-10-02 PROBLEM — S46.211A STRAIN OF RIGHT BICEPS TENDON: Status: RESOLVED | Noted: 2023-08-21 | Resolved: 2024-10-02

## 2024-10-02 PROBLEM — F41.8 SITUATIONAL ANXIETY: Status: RESOLVED | Noted: 2018-10-23 | Resolved: 2024-10-02

## 2024-10-02 PROCEDURE — 90472 IMMUNIZATION ADMIN EACH ADD: CPT

## 2024-10-02 PROCEDURE — 90471 IMMUNIZATION ADMIN: CPT

## 2024-10-02 PROCEDURE — 99396 PREV VISIT EST AGE 40-64: CPT | Performed by: FAMILY MEDICINE

## 2024-10-02 PROCEDURE — 90656 IIV3 VACC NO PRSV 0.5 ML IM: CPT

## 2024-10-02 PROCEDURE — 90715 TDAP VACCINE 7 YRS/> IM: CPT

## 2024-10-02 RX ORDER — SERTRALINE HYDROCHLORIDE 100 MG/1
100 TABLET, FILM COATED ORAL DAILY
Qty: 90 TABLET | Refills: 1 | Status: SHIPPED | OUTPATIENT
Start: 2024-10-02

## 2024-10-02 RX ORDER — LOSARTAN POTASSIUM 25 MG/1
25 TABLET ORAL DAILY
Qty: 90 TABLET | Refills: 1 | Status: SHIPPED | OUTPATIENT
Start: 2024-10-02

## 2024-10-02 NOTE — PROGRESS NOTES
Assessment/Plan:    No problem-specific Assessment & Plan notes found for this encounter.    Cpe    Htn stable on meds, f/u q6m    Depression stable on zoloft 100mg, continue    Bmi discussed, encourage wt loss    Prediabetes, watch carbs, wt loss     Diagnoses and all orders for this visit:    Healthcare maintenance    Immunization due  -     TDAP VACCINE GREATER THAN OR EQUAL TO 8YO IM  -     influenza vaccine preservative-free 0.5 mL IM (Fluzone, Afluria, Fluarix, Flulaval)    Screening for diabetes mellitus (DM)  -     Comprehensive metabolic panel; Future  -     Comprehensive metabolic panel    Screening for lipid disorders  -     Lipid Panel with Direct LDL reflex; Future  -     Lipid Panel with Direct LDL reflex    Prostate cancer screening  -     PSA Total (Reflex To Free); Future  -     PSA Total (Reflex To Free)    Prediabetes  -     Hemoglobin A1C; Future  -     Hemoglobin A1C    Essential hypertension  -     losartan (COZAAR) 25 mg tablet; Take 1 tablet (25 mg total) by mouth daily    Severe depression (HCC)  -     sertraline (ZOLOFT) 100 mg tablet; Take 1 tablet (100 mg total) by mouth daily    CHA (generalized anxiety disorder)  -     sertraline (ZOLOFT) 100 mg tablet; Take 1 tablet (100 mg total) by mouth daily    BMI 33.0-33.9,adult        Return in about 6 months (around 4/2/2025) for Recheck.    Subjective:      Patient ID: Gavino Momin is a 39 y.o. male.    Chief Complaint   Patient presents with    Well Check     Jmoyle LPN       HPI  Job is stressful  Not a physical job  No formal program  Wt about same  Not counting calories    The following portions of the patient's history were reviewed and updated as appropriate: allergies, current medications, past family history, past medical history, past social history, past surgical history and problem list.    Review of Systems   Constitutional:  Negative for chills and fever.         Current Outpatient Medications   Medication Sig Dispense Refill     hydrOXYzine pamoate (VISTARIL) 25 mg capsule Take 1 capsule (25 mg total) by mouth 2 (two) times a day as needed for anxiety 60 capsule 1    losartan (COZAAR) 25 mg tablet Take 1 tablet (25 mg total) by mouth daily 90 tablet 1    sertraline (ZOLOFT) 100 mg tablet Take 1 tablet (100 mg total) by mouth daily 90 tablet 1     No current facility-administered medications for this visit.       Objective:    /84   Pulse 68   Temp (!) 96 °F (35.6 °C)   Resp 20   Ht 6' (1.829 m)   Wt 112 kg (247 lb)   BMI 33.50 kg/m²        Physical Exam  Vitals and nursing note reviewed.   Constitutional:       General: He is not in acute distress.     Appearance: He is well-developed. He is obese. He is not ill-appearing.   HENT:      Head: Normocephalic.      Right Ear: Tympanic membrane normal.      Left Ear: Tympanic membrane normal.   Eyes:      General: No scleral icterus.     Conjunctiva/sclera: Conjunctivae normal.   Neck:      Vascular: No carotid bruit.   Cardiovascular:      Rate and Rhythm: Normal rate and regular rhythm.      Heart sounds: No murmur heard.  Pulmonary:      Effort: Pulmonary effort is normal. No respiratory distress.      Breath sounds: No wheezing.   Abdominal:      General: There is no distension.      Palpations: Abdomen is soft.      Tenderness: There is no abdominal tenderness.      Hernia: No hernia is present.   Genitourinary:     Penis: Normal.       Testes: Normal.      Prostate: Normal.      Rectum: Normal.   Musculoskeletal:         General: No deformity.      Cervical back: Neck supple.      Right lower leg: No edema.      Left lower leg: No edema.   Skin:     General: Skin is warm and dry.      Coloration: Skin is not jaundiced or pale.   Neurological:      Mental Status: He is alert.      Motor: No weakness.      Gait: Gait normal.   Psychiatric:         Mood and Affect: Mood normal.         Behavior: Behavior normal.         Thought Content: Thought content normal.           Jovanny  Thomas, DO

## 2024-10-13 LAB
ALBUMIN SERPL-MCNC: 4.6 G/DL (ref 4.1–5.1)
ALP SERPL-CCNC: 105 IU/L (ref 44–121)
ALT SERPL-CCNC: 25 IU/L (ref 0–44)
AST SERPL-CCNC: 25 IU/L (ref 0–40)
BILIRUB SERPL-MCNC: 0.6 MG/DL (ref 0–1.2)
BUN SERPL-MCNC: 14 MG/DL (ref 6–24)
BUN/CREAT SERPL: 13 (ref 9–20)
CALCIUM SERPL-MCNC: 9.5 MG/DL (ref 8.7–10.2)
CHLORIDE SERPL-SCNC: 101 MMOL/L (ref 96–106)
CHOLEST SERPL-MCNC: 201 MG/DL (ref 100–199)
CO2 SERPL-SCNC: 24 MMOL/L (ref 20–29)
CREAT SERPL-MCNC: 1.07 MG/DL (ref 0.76–1.27)
EGFR: 90 ML/MIN/1.73
EST. AVERAGE GLUCOSE BLD GHB EST-MCNC: 123 MG/DL
GLOBULIN SER-MCNC: 2.5 G/DL (ref 1.5–4.5)
GLUCOSE SERPL-MCNC: 100 MG/DL (ref 70–99)
HBA1C MFR BLD: 5.9 % (ref 4.8–5.6)
HDLC SERPL-MCNC: 37 MG/DL
LDLC SERPL CALC-MCNC: 133 MG/DL (ref 0–99)
LDLC/HDLC SERPL: 3.6 RATIO (ref 0–3.6)
MICRODELETION SYND BLD/T FISH: NORMAL
POTASSIUM SERPL-SCNC: 4.5 MMOL/L (ref 3.5–5.2)
PROT SERPL-MCNC: 7.1 G/DL (ref 6–8.5)
PSA SERPL-MCNC: 1.9 NG/ML (ref 0–4)
SL AMB REFLEX CRITERIA: NORMAL
SL AMB VLDL CHOLESTEROL CALC: 31 MG/DL (ref 5–40)
SODIUM SERPL-SCNC: 139 MMOL/L (ref 134–144)
TRIGL SERPL-MCNC: 172 MG/DL (ref 0–149)

## 2024-11-01 PROBLEM — Z00.00 HEALTHCARE MAINTENANCE: Status: RESOLVED | Noted: 2024-10-02 | Resolved: 2024-11-01

## 2025-05-10 DIAGNOSIS — I10 ESSENTIAL HYPERTENSION: ICD-10-CM

## 2025-05-11 RX ORDER — LOSARTAN POTASSIUM 25 MG/1
25 TABLET ORAL DAILY
Qty: 90 TABLET | Refills: 1 | Status: SHIPPED | OUTPATIENT
Start: 2025-05-11

## 2025-06-29 ENCOUNTER — TELEPHONE (OUTPATIENT)
Dept: FAMILY MEDICINE CLINIC | Facility: CLINIC | Age: 40
End: 2025-06-29

## 2025-06-29 DIAGNOSIS — F41.1 GAD (GENERALIZED ANXIETY DISORDER): ICD-10-CM

## 2025-06-29 DIAGNOSIS — F32.2 SEVERE DEPRESSION (HCC): ICD-10-CM

## 2025-07-02 RX ORDER — SERTRALINE HYDROCHLORIDE 100 MG/1
100 TABLET, FILM COATED ORAL DAILY
Qty: 30 TABLET | Refills: 0 | Status: SHIPPED | OUTPATIENT
Start: 2025-07-02

## 2025-07-26 ENCOUNTER — TELEPHONE (OUTPATIENT)
Dept: FAMILY MEDICINE CLINIC | Facility: CLINIC | Age: 40
End: 2025-07-26

## 2025-07-26 DIAGNOSIS — F32.2 SEVERE DEPRESSION (HCC): ICD-10-CM

## 2025-07-26 DIAGNOSIS — F41.1 GAD (GENERALIZED ANXIETY DISORDER): ICD-10-CM

## 2025-07-30 RX ORDER — SERTRALINE HYDROCHLORIDE 100 MG/1
100 TABLET, FILM COATED ORAL DAILY
Qty: 30 TABLET | Refills: 0 | Status: SHIPPED | OUTPATIENT
Start: 2025-07-30